# Patient Record
Sex: FEMALE | Race: WHITE | NOT HISPANIC OR LATINO | Employment: FULL TIME
[De-identification: names, ages, dates, MRNs, and addresses within clinical notes are randomized per-mention and may not be internally consistent; named-entity substitution may affect disease eponyms.]

---

## 2018-04-25 ENCOUNTER — TRANSCRIBE ORDERS (OUTPATIENT)
Dept: SCHEDULING | Age: 28
End: 2018-04-25

## 2018-04-25 DIAGNOSIS — Q24.8 CONGENITAL MALPOSITION OF HEART AND CARDIAC APEX: Primary | ICD-10-CM

## 2020-02-27 ENCOUNTER — HOSPITAL ENCOUNTER (EMERGENCY)
Facility: HOSPITAL | Age: 30
Discharge: HOME | End: 2020-02-27
Attending: EMERGENCY MEDICINE
Payer: COMMERCIAL

## 2020-02-27 ENCOUNTER — APPOINTMENT (EMERGENCY)
Dept: RADIOLOGY | Facility: HOSPITAL | Age: 30
End: 2020-02-27
Attending: EMERGENCY MEDICINE
Payer: COMMERCIAL

## 2020-02-27 VITALS
DIASTOLIC BLOOD PRESSURE: 55 MMHG | BODY MASS INDEX: 20.4 KG/M2 | RESPIRATION RATE: 16 BRPM | SYSTOLIC BLOOD PRESSURE: 103 MMHG | TEMPERATURE: 98.1 F | HEART RATE: 75 BPM | WEIGHT: 130 LBS | HEIGHT: 67 IN | OXYGEN SATURATION: 100 %

## 2020-02-27 DIAGNOSIS — M79.662 PAIN AND SWELLING OF LEFT LOWER LEG: Primary | ICD-10-CM

## 2020-02-27 DIAGNOSIS — M79.89 PAIN AND SWELLING OF LEFT LOWER LEG: Primary | ICD-10-CM

## 2020-02-27 PROCEDURE — 99284 EMERGENCY DEPT VISIT MOD MDM: CPT | Mod: 25

## 2020-02-27 PROCEDURE — 93971 EXTREMITY STUDY: CPT | Mod: LT

## 2020-02-27 ASSESSMENT — ENCOUNTER SYMPTOMS
WEAKNESS: 0
FEVER: 0
SHORTNESS OF BREATH: 0
CHILLS: 0
ABDOMINAL PAIN: 0
NUMBNESS: 0
WOUND: 1

## 2020-02-27 NOTE — ED PROVIDER NOTES
"HPI     Chief Complaint   Patient presents with   • Lower Extremity Issue       29-year-old female presents to the ED for evaluation of left leg pain.  Patient reports hitting her left shin a month ago on a fryer, developing small wound, which she was never evaluated for.  She presents with a small healed abrasion over her lower left shin.  Since yesterday she noticed increasing discomfort and \"puffiness\" of her left lower leg around the wound, reports pain severity is 5 out of 10 and worsens with bearing weight.  She is concerned about blood clot due to having a positive home pregnancy test last week, LNMP:  (A) .  Denies chest pain, shortness of breath, history of previous blood clots, recent long car rides or plane trips, recent surgery, OCP use, drainage from her wound, fever.       History provided by:  Patient  Lower Extremity Issue   Associated symptoms: no fever         Patient History     History reviewed. No pertinent past medical history.    Past Surgical History:   Procedure Laterality Date   • CYST REMOVAL      on heart       History reviewed. No pertinent family history.    Social History     Tobacco Use   • Smoking status: Never Smoker   • Smokeless tobacco: Never Used   Substance Use Topics   • Alcohol use: Not Currently   • Drug use: Not Currently       Systems Reviewed from Nursing Triage:          Review of Systems     Review of Systems   Constitutional: Negative for chills and fever.   Respiratory: Negative for shortness of breath.    Cardiovascular: Positive for leg swelling (left leg). Negative for chest pain.   Gastrointestinal: Negative for abdominal pain.   Genitourinary: Negative for vaginal bleeding.   Musculoskeletal: Negative for gait problem.        Left lower leg pain   Skin: Positive for wound.   Neurological: Negative for weakness and numbness.        Physical Exam     ED Triage Vitals [20 1817]   Temp Heart Rate Resp BP SpO2   36.8 °C (98.3 °F) 74 18 (!) 101/55 " "100 %      Temp Source Heart Rate Source Patient Position BP Location FiO2 (%) (Set)   Tympanic Monitor Sitting Left upper arm --                     Patient Vitals for the past 24 hrs:   BP Temp Temp src Pulse Resp SpO2 Height Weight   02/27/20 1958 (!) 103/55 36.7 °C (98.1 °F) Tympanic 75 16 100 % -- --   02/27/20 1817 (!) 101/55 36.8 °C (98.3 °F) Tympanic 74 18 100 % 1.702 m (5' 7\") 59 kg (130 lb)                                       Physical Exam   Constitutional: She appears well-developed and well-nourished. No distress.   Cardiovascular: Normal rate, regular rhythm and normal heart sounds.   Pulses:       Dorsalis pedis pulses are 2+ on the left side.        Posterior tibial pulses are 2+ on the left side.   Pulmonary/Chest: Effort normal and breath sounds normal. No respiratory distress.   Musculoskeletal:   Left calf soft, NTTP, negative camelia's sign    Mild left shin TTP with minimal swelling   Neurological: She is alert.   LLE NVI   Skin: Skin is warm and dry.   Small 1cm well healing abrasion left distal shin, no erythema, drainage, or warmth    Nursing note and vitals reviewed.           Procedures    ED Course & MDM     Labs Reviewed - No data to display    US venous leg, LL extremity   Final Result   IMPRESSION:   No evidence for left - sided deep venous thrombosis.      I certify that I have personally reviewed this examination and agree with this   report.   Jak Farfan MD                  MDM  Number of Diagnoses or Management Options  Pain and swelling of left lower leg:      Amount and/or Complexity of Data Reviewed  Tests in the radiology section of CPT®: ordered and reviewed             ED Course as of Feb 27 2119   Thu Feb 27, 2020   1840 Plan: LLE US to r/o DVT    [TC]   1941 IMPRESSION:  No evidence for left - sided deep venous thrombosis.   US venous leg, LL extremity [TC]   1941 Will have pt FU with PCP for repeat US in 10 days if sx persist. Elevation, compression stockings, and " APAP recommended for pain and swelling.    [TC]      ED Course User Index  [TC] Matilde Castro PA C         Clinical Impressions as of Feb 27 2119   Pain and swelling of left lower leg     Disposition  Discharged      Matilde Castro PA C  02/27/20 2120

## 2020-02-27 NOTE — ED ATTESTATION NOTE
"I have personally seen and examined the patient.  I reviewed and agree with physician assistant / nurse practitioner’s assessment and plan of care, with the following exceptions: None  My examination, assessment, and plan of care of Shreya De Jesus is as follows:     Patient is a 29y F presenting to the ED due to concern for LLE swelling. Bumped her L shin about a month ago. Abrasion has been healing. No stitched/medical care sought at the time. Over past few days, has noticed \"puffiness\" around the wound. No drainage. No fevers    On exam, small subcentimeter healing wound to the anterior L shin. Minimal surrounding soft tissue swelling. No tenderness/drainage/erythema.     Patient recently had + pregnancy test. No complaints related to this. Will check LLE US to r/o DVT. Clinically, no signs of infection     Kelly Ryder MD  02/27/20 9016    "

## 2020-02-28 NOTE — DISCHARGE INSTRUCTIONS
Take only Tylenol as needed for pain as instructed on the bottle    Return to the emergency department for worsening of symptoms or if you develop any new concerning symptoms including   chest pain, difficulty breathing, leg redness, drainage from wound.

## 2020-03-19 ENCOUNTER — APPOINTMENT (EMERGENCY)
Dept: RADIOLOGY | Facility: HOSPITAL | Age: 30
End: 2020-03-19
Attending: EMERGENCY MEDICINE
Payer: COMMERCIAL

## 2020-03-19 ENCOUNTER — HOSPITAL ENCOUNTER (EMERGENCY)
Facility: HOSPITAL | Age: 30
Discharge: HOME | End: 2020-03-19
Attending: EMERGENCY MEDICINE
Payer: COMMERCIAL

## 2020-03-19 VITALS
OXYGEN SATURATION: 100 % | TEMPERATURE: 98.9 F | HEART RATE: 47 BPM | DIASTOLIC BLOOD PRESSURE: 53 MMHG | SYSTOLIC BLOOD PRESSURE: 94 MMHG | RESPIRATION RATE: 18 BRPM

## 2020-03-19 DIAGNOSIS — B34.9 VIRAL SYNDROME: Primary | ICD-10-CM

## 2020-03-19 LAB
ANION GAP SERPL CALC-SCNC: 10 MEQ/L (ref 3–15)
BASOPHILS # BLD: 0.03 K/UL (ref 0.01–0.1)
BASOPHILS NFR BLD: 0.4 %
BUN SERPL-MCNC: 7 MG/DL (ref 8–20)
CALCIUM SERPL-MCNC: 9.5 MG/DL (ref 8.9–10.3)
CHLORIDE SERPL-SCNC: 103 MEQ/L (ref 98–109)
CO2 SERPL-SCNC: 23 MEQ/L (ref 22–32)
CREAT SERPL-MCNC: 0.7 MG/DL (ref 0.6–1.1)
D DIMER PPP IA.FEU-MCNC: 0.45 UG/ML FEU (ref 0–0.5)
DIFFERENTIAL METHOD BLD: NORMAL
EOSINOPHIL # BLD: 0.07 K/UL (ref 0.04–0.36)
EOSINOPHIL NFR BLD: 0.8 %
ERYTHROCYTE [DISTWIDTH] IN BLOOD BY AUTOMATED COUNT: 12.8 % (ref 11.7–14.4)
FLUAV RNA SPEC QL NAA+PROBE: NEGATIVE
FLUBV RNA SPEC QL NAA+PROBE: NEGATIVE
GFR SERPL CREATININE-BSD FRML MDRD: >60 ML/MIN/1.73M*2
GLUCOSE SERPL-MCNC: 83 MG/DL (ref 70–99)
HCT VFR BLDCO AUTO: 36.7 % (ref 35–45)
HGB BLD-MCNC: 12.3 G/DL (ref 11.8–15.7)
IMM GRANULOCYTES # BLD AUTO: 0.03 K/UL (ref 0–0.08)
IMM GRANULOCYTES NFR BLD AUTO: 0.4 %
LYMPHOCYTES # BLD: 1.83 K/UL (ref 1.2–3.5)
LYMPHOCYTES NFR BLD: 22 %
MCH RBC QN AUTO: 28.9 PG (ref 28–33.2)
MCHC RBC AUTO-ENTMCNC: 33.5 G/DL (ref 32.2–35.5)
MCV RBC AUTO: 86.2 FL (ref 83–98)
MONOCYTES # BLD: 0.76 K/UL (ref 0.28–0.8)
MONOCYTES NFR BLD: 9.2 %
NEUTROPHILS # BLD: 5.58 K/UL (ref 1.7–7)
NEUTS SEG NFR BLD: 67.2 %
NRBC BLD-RTO: 0 %
PDW BLD AUTO: 11.2 FL (ref 9.4–12.3)
PLATELET # BLD AUTO: 230 K/UL (ref 150–369)
POTASSIUM SERPL-SCNC: 3.9 MEQ/L (ref 3.6–5.1)
RBC # BLD AUTO: 4.26 M/UL (ref 3.93–5.22)
RSV RNA SPEC QL NAA+PROBE: NEGATIVE
SODIUM SERPL-SCNC: 136 MEQ/L (ref 136–144)
TROPONIN I SERPL-MCNC: <0.02 NG/ML
WBC # BLD AUTO: 8.3 K/UL (ref 3.8–10.5)

## 2020-03-19 PROCEDURE — 87631 RESP VIRUS 3-5 TARGETS: CPT | Performed by: PHYSICIAN ASSISTANT

## 2020-03-19 PROCEDURE — 85379 FIBRIN DEGRADATION QUANT: CPT | Performed by: PHYSICIAN ASSISTANT

## 2020-03-19 PROCEDURE — U0002 COVID-19 LAB TEST NON-CDC: HCPCS | Performed by: PHYSICIAN ASSISTANT

## 2020-03-19 PROCEDURE — 25800000 HC PHARMACY IV SOLUTIONS: Performed by: EMERGENCY MEDICINE

## 2020-03-19 PROCEDURE — 96361 HYDRATE IV INFUSION ADD-ON: CPT

## 2020-03-19 PROCEDURE — 99284 EMERGENCY DEPT VISIT MOD MDM: CPT | Mod: 25

## 2020-03-19 PROCEDURE — 84484 ASSAY OF TROPONIN QUANT: CPT | Performed by: PHYSICIAN ASSISTANT

## 2020-03-19 PROCEDURE — 80048 BASIC METABOLIC PNL TOTAL CA: CPT | Performed by: PHYSICIAN ASSISTANT

## 2020-03-19 PROCEDURE — 71045 X-RAY EXAM CHEST 1 VIEW: CPT

## 2020-03-19 PROCEDURE — 93005 ELECTROCARDIOGRAM TRACING: CPT | Performed by: EMERGENCY MEDICINE

## 2020-03-19 PROCEDURE — 96360 HYDRATION IV INFUSION INIT: CPT

## 2020-03-19 PROCEDURE — 85025 COMPLETE CBC W/AUTO DIFF WBC: CPT | Performed by: PHYSICIAN ASSISTANT

## 2020-03-19 PROCEDURE — 3E0337Z INTRODUCTION OF ELECTROLYTIC AND WATER BALANCE SUBSTANCE INTO PERIPHERAL VEIN, PERCUTANEOUS APPROACH: ICD-10-PCS | Performed by: EMERGENCY MEDICINE

## 2020-03-19 PROCEDURE — 36415 COLL VENOUS BLD VENIPUNCTURE: CPT | Performed by: PHYSICIAN ASSISTANT

## 2020-03-19 RX ADMIN — SODIUM CHLORIDE 1000 ML: 9 INJECTION, SOLUTION INTRAVENOUS at 17:38

## 2020-03-19 ASSESSMENT — ENCOUNTER SYMPTOMS
FEVER: 0
CHEST TIGHTNESS: 0
CHILLS: 0
PALPITATIONS: 0
RHINORRHEA: 1
COUGH: 0
DIZZINESS: 0
SHORTNESS OF BREATH: 1
VOMITING: 0
DIARRHEA: 0
HEADACHES: 0
ABDOMINAL PAIN: 0
APPETITE CHANGE: 0
LIGHT-HEADEDNESS: 0
NAUSEA: 0

## 2020-03-19 NOTE — ED PROVIDER NOTES
HPI     Chief Complaint   Patient presents with   • Nasal Congestion     started today with intermittent sob up the steps       Patient is a 29-year-old female G3, P2 currently 6 weeks pregnant without significant medical history presenting with chief complaint of nasal congestion and shortness of breath.  Patient states about 3 days ago she began to experience a sore throat which is now gone and now experiencing nasal congestion/rhinorrhea.  Patient admits to dyspnea which started today, most noticeable when she walks up stairs.  She denies any cough or chest pain.  Patient denies fever/chills.  She denies any sick contacts with similar symptoms.  She denies personal or family history of DVT/PE, recent lengthy travel, calf pain/swelling.  Patient notes she works at [employer] and therefore is around many people each day.      History provided by:  Patient       Patient History     History reviewed. No pertinent past medical history.    Past Surgical History:   Procedure Laterality Date   • CYST REMOVAL      on heart       History reviewed. No pertinent family history.    Social History     Tobacco Use   • Smoking status: Never Smoker   • Smokeless tobacco: Never Used   Substance Use Topics   • Alcohol use: Not Currently   • Drug use: Not Currently       Systems Reviewed from Nursing Triage:          Review of Systems     Review of Systems   Constitutional: Negative for appetite change, chills and fever.   HENT: Positive for congestion and rhinorrhea.    Respiratory: Positive for shortness of breath. Negative for cough and chest tightness.    Cardiovascular: Negative for chest pain, palpitations and leg swelling.   Gastrointestinal: Negative for abdominal pain, diarrhea, nausea and vomiting.   Neurological: Negative for dizziness, light-headedness and headaches.        Physical Exam     ED Triage Vitals   Temp Heart Rate Resp BP SpO2   03/19/20 1657 03/19/20 1657 03/19/20 1657 03/19/20 1700 03/19/20 1657   37.2 °C (99 °F)  (!) 58 20 107/63 100 %      Temp Source Heart Rate Source Patient Position BP Location FiO2 (%) (Set)   03/19/20 1657 -- 03/19/20 1657 03/19/20 1657 --   Tympanic  Lying Right upper arm        Pulse Ox %: 100 % (03/19/20 1728)  Pulse Ox Interpretation: Normal (03/19/20 1728)  Heart Rate: 58 (03/19/20 1728)  Rhythm Strip Interpretation: Sinus Bradycardia (03/19/20 1728)    Patient Vitals for the past 24 hrs:   BP Temp Temp src Pulse Resp SpO2 Weight   03/19/20 1908 (!) 94/53 37.2 °C (98.9 °F) Tympanic (!) 47 18 100 % --   03/19/20 1700 107/63 -- -- -- -- -- --   03/19/20 1657 -- 37.2 °C (99 °F) Tympanic (!) 58 20 100 % --                                       Physical Exam   Constitutional: She is oriented to person, place, and time. She appears well-developed and well-nourished. No distress.   HENT:   Head: Normocephalic and atraumatic.   Eyes: Pupils are equal, round, and reactive to light. Conjunctivae and EOM are normal.   Neck: Normal range of motion.   Cardiovascular: Normal rate and regular rhythm.   Pulses:       Radial pulses are 2+ on the right side, and 2+ on the left side.        Dorsalis pedis pulses are 2+ on the right side, and 2+ on the left side.        Posterior tibial pulses are 2+ on the right side, and 2+ on the left side.   Pulmonary/Chest: Effort normal and breath sounds normal. No stridor. No respiratory distress. She has no wheezes. She has no rales.   Abdominal: Soft. She exhibits no distension. There is no tenderness.   Musculoskeletal:        Right lower leg: She exhibits no tenderness and no edema.        Left lower leg: She exhibits no tenderness and no edema.   Neurological: She is alert and oriented to person, place, and time.   Skin: Skin is warm and dry.   Nursing note and vitals reviewed.           Procedures    ED Course & MDM     Labs Reviewed   BASIC METABOLIC PANEL - Abnormal       Result Value    Sodium 136      Potassium 3.9      Chloride 103      CO2 23      BUN 7 (*)      Creatinine 0.7      Glucose 83      Calcium 9.5      eGFR >60.0      Anion Gap 10     RSV AND INFLUENZA A/B NUCLEIC ACIDS, PCR - Normal    Influenza A Negative      Influenza B Negative      Respiratory Syncytial Virus Negative     D-DIMER - Normal    D-Dimer, Quant 0.45     TROPONIN I - Normal    Troponin I <0.02     CBC AND DIFF    WBC 8.30      RBC 4.26      Hemoglobin 12.3      Hematocrit 36.7      MCV 86.2      MCH 28.9      MCHC 33.5      RDW 12.8      Platelets 230      MPV 11.2      Differential Type Auto      nRBC 0.0      Immature Granulocytes 0.4      Neutrophils 67.2      Lymphocytes 22.0      Monocytes 9.2      Eosinophils 0.8      Basophils 0.4      Immature Granulocytes, Absolute 0.03      Neutrophils, Absolute 5.58      Lymphocytes, Absolute 1.83      Monocytes, Absolute 0.76      Eosinophils, Absolute 0.07      Basophils, Absolute 0.03     COVID-19 ([facility])       ECG 12 lead   ED Interpretation   sb 53   Na   qtc 369   nostemi            X-RAY CHEST 1 VIEW   Final Result   IMPRESSION: No acute disease appreciated                  [facility]         ED Course as of Mar 19 1938   Thu Mar 19, 2020   1810 D-Dimer, Quant: 0.45 [KM]   1834 IMPRESSION: No acute disease appreciated   X-RAY CHEST 1 VIEW [KM]   1845 Work-up is unremarkable.  Patient was informed to quarantine until her COVID test is resulted.  Patient advised that anyone in the household also needs to quarantine.  Patient advised to return to the ER immediately if she experiences worsening of symptoms or any other new concerns.  Patient given a work note.    Patient to follow-up with her PCP and OB.    [KM]   1846 Encounter for testing of COVID-19.  Full PPE worn for evaluation.  Pt information sent to [facility] for COVID follow up  Will d/c home to self-quarantine until return call regarding results.  Discussed strict return precautions including worsening condition, difficulty breathing/shortness of breath, chest pain, or any other  concerning symptoms.  Pt verbalizes understanding of these instructions.  Will d/c at this time.       [KM]      ED Course User Index  [KM] Adri Garcia PA C         Clinical Impressions as of Mar 19 1938   Viral syndrome      Dispo  discharge       Adri Garcia PA C  03/19/20 1938

## 2020-03-19 NOTE — ED ATTESTATION NOTE
Physician Attestation:     I personally saw and evaluated the patient, participated in the management, and agree with the findings in the above note except as where stated.  The Physician Assistant and I discussed  the case, workup, and disposition.      Chief Complaint  Chief Complaint   Patient presents with   • Nasal Congestion     started today with intermittent sob up the steps     29-year-old female G3, P2 approximately 6 weeks pregnant without a confirmed intrauterine pregnancy presents to the emergency department for evaluation of URI symptoms and shortness of breath.  Patient states that she started noticing today that she was getting dyspnea on exertion primarily while walking up steps.  No chest pain no hemoptysis no lower leg swelling or pain no recent surgery no recent travel no estrogen use.  Patient without fever without cough does have rhinorrhea sore throat and nasal congestion.  Patient is a  at [employer] concerned that she might have contracted the novel coronavirus.  No known contacts with a diagnosed positive.  She has not had a fever.    Patient is nontoxic well-appearing and in no acute distress  Her vital signs are stable  Regular rate and rhythm  Lungs are clear to auscultation throughout there is no tachypnea no respiratory distress no accessory muscle use  Abdomen is soft nontender non-peritoneal/4  No edema or calf tenderness    Plan: Dyspnea on exertion in the setting of pregnancy.  My suspicion is low for pulmonary embolism and she technically perks out however given she is 6 weeks pregnant we will obtain a d-dimer.  Additionally with the rhinorrhea and other URI symptoms in the setting of this pandemic we will need to test for flu and the coronavirus.  Patient understanding and agreeing with plan       Fredo Olivares,   03/19/20 9961

## 2020-03-19 NOTE — DISCHARGE INSTRUCTIONS
Drink plenty of fluids stay well-hydrated.    Call your PCP and OB within 48 hours to discuss symptoms.    You will need to stay quarantined until your tests are resulted regarding COVID.    Return to the emergency department if you experience worsening of symptoms, chest pain, any other new concerning symptoms.

## 2020-03-20 LAB
ATRIAL RATE: 53
P AXIS: 55
PR INTERVAL: 162
QRS DURATION: 88
QT INTERVAL: 394
QTC CALCULATION(BAZETT): 369
R AXIS: 91
T WAVE AXIS: 71
VENTRICULAR RATE: 53

## 2020-03-21 LAB
QUEST OVERALL RESULT:: NOT DETECTED
SARS-COV RNA SPEC QL NAA+PROBE: NEGATIVE
SARS-COV-2 RNA RESP QL NAA+PROBE: NEGATIVE
SPECIMEN SOURCE: NORMAL
SYMPTOM: NORMAL

## 2020-03-22 NOTE — RESULT ENCOUNTER NOTE
Called patient informed of negative COVID 19 results.
Called patient unable to reach.
Unable to reach patient. Left message for patient to call back`
Improved

## 2020-04-16 ENCOUNTER — TRANSCRIBE ORDERS (OUTPATIENT)
Dept: SCHEDULING | Age: 30
End: 2020-04-16

## 2020-04-21 ENCOUNTER — TRANSCRIBE ORDERS (OUTPATIENT)
Dept: SCHEDULING | Age: 30
End: 2020-04-21

## 2020-04-21 DIAGNOSIS — O46.90 VAGINAL BLEEDING IN PREGNANCY: Primary | ICD-10-CM

## 2020-04-21 DIAGNOSIS — O35.2XX0 MATERNAL CARE FOR (SUSPECTED) HEREDITARY DISEASE IN FETUS, NOT APPLICABLE OR UNSPECIFIED: Primary | ICD-10-CM

## 2020-04-21 LAB
HBV SURFACE AG SER QL: NONREACTIVE
HIV 1+2 AB+HIV1 P24 AG SERPL QL IA: NONREACTIVE
RUBELLA IGG SCREEN: NORMAL

## 2020-04-22 ENCOUNTER — HOSPITAL ENCOUNTER (OUTPATIENT)
Dept: PERINATAL CARE | Facility: HOSPITAL | Age: 30
Discharge: HOME | End: 2020-04-22
Attending: OBSTETRICS & GYNECOLOGY
Payer: COMMERCIAL

## 2020-04-22 VITALS — BODY MASS INDEX: 20.4 KG/M2 | HEIGHT: 67 IN | WEIGHT: 130 LBS

## 2020-04-22 DIAGNOSIS — O35.2XX0 MATERNAL CARE FOR (SUSPECTED) HEREDITARY DISEASE IN FETUS, NOT APPLICABLE OR UNSPECIFIED: ICD-10-CM

## 2020-04-22 DIAGNOSIS — O28.9 ABNORMAL FINDINGS ON ANTENATAL SCREENING: ICD-10-CM

## 2020-04-22 DIAGNOSIS — Z3A.13 13 WEEKS GESTATION OF PREGNANCY: ICD-10-CM

## 2020-04-22 DIAGNOSIS — O09.91 ENCOUNTER FOR SUPERVISION OF HIGH RISK PREGNANCY IN FIRST TRIMESTER, ANTEPARTUM: ICD-10-CM

## 2020-04-22 DIAGNOSIS — O26.841 UTERINE SIZE DATE DISCREPANCY PREGNANCY, FIRST TRIMESTER: ICD-10-CM

## 2020-04-22 DIAGNOSIS — O35.10X0 SUSPECTED CHROMOSOME ANOMALY OF FETUS AFFECTING MANAGEMENT OF MOTHER, ANTEPARTUM, SINGLE OR UNSPECIFIED FETUS: ICD-10-CM

## 2020-04-22 PROCEDURE — 76945 ECHO GUIDE VILLUS SAMPLING: CPT

## 2020-04-22 PROCEDURE — 76801 OB US < 14 WKS SINGLE FETUS: CPT

## 2020-04-22 PROCEDURE — 0UB93ZX EXCISION OF UTERUS, PERCUTANEOUS APPROACH, DIAGNOSTIC: ICD-10-PCS | Performed by: OBSTETRICS & GYNECOLOGY

## 2020-04-30 ENCOUNTER — TRANSCRIBE ORDERS (OUTPATIENT)
Dept: REGISTRATION | Facility: HOSPITAL | Age: 30
End: 2020-04-30

## 2020-04-30 DIAGNOSIS — O35.2XX0 MATERNAL CARE FOR (SUSPECTED) HEREDITARY DISEASE IN FETUS, NOT APPLICABLE OR UNSPECIFIED: Primary | ICD-10-CM

## 2020-05-12 ENCOUNTER — TRANSCRIBE ORDERS (OUTPATIENT)
Dept: REGISTRATION | Facility: HOSPITAL | Age: 30
End: 2020-05-12

## 2020-05-12 ENCOUNTER — HOSPITAL ENCOUNTER (OUTPATIENT)
Dept: PERINATAL CARE | Facility: HOSPITAL | Age: 30
Discharge: HOME | End: 2020-05-12
Attending: OBSTETRICS & GYNECOLOGY
Payer: COMMERCIAL

## 2020-05-12 DIAGNOSIS — D18.1 LYMPHANGIOMA, ANY SITE: ICD-10-CM

## 2020-05-12 DIAGNOSIS — O28.9 ABNORMAL FINDINGS ON ANTENATAL SCREENING: ICD-10-CM

## 2020-05-12 DIAGNOSIS — Z3A.16 16 WEEKS GESTATION OF PREGNANCY: ICD-10-CM

## 2020-05-12 DIAGNOSIS — O35.9XX0 MATERNAL CARE FOR (SUSPECTED) FETAL ABNORMALITY AND DAMAGE, UNSPECIFIED, NOT APPLICABLE OR UNSPECIFIED: Primary | ICD-10-CM

## 2020-05-12 DIAGNOSIS — O09.92 SUPERVISION OF HIGH RISK PREGNANCY, UNSPECIFIED, SECOND TRIMESTER: ICD-10-CM

## 2020-05-12 DIAGNOSIS — O35.10X0 SUSPECTED CHROMOSOME ANOMALY OF FETUS AFFECTING MANAGEMENT OF MOTHER, ANTEPARTUM, SINGLE OR UNSPECIFIED FETUS: ICD-10-CM

## 2020-05-12 DIAGNOSIS — O35.2XX0 MATERNAL CARE FOR (SUSPECTED) HEREDITARY DISEASE IN FETUS, NOT APPLICABLE OR UNSPECIFIED: ICD-10-CM

## 2020-05-12 DIAGNOSIS — O09.92 ENCOUNTER FOR SUPERVISION OF HIGH RISK PREGNANCY IN SECOND TRIMESTER, ANTEPARTUM: ICD-10-CM

## 2020-05-12 PROCEDURE — 76805 OB US >/= 14 WKS SNGL FETUS: CPT

## 2020-06-08 ENCOUNTER — HOSPITAL ENCOUNTER (OUTPATIENT)
Dept: PERINATAL CARE | Facility: HOSPITAL | Age: 30
Discharge: HOME | End: 2020-06-08
Attending: OBSTETRICS & GYNECOLOGY
Payer: COMMERCIAL

## 2020-06-08 DIAGNOSIS — O09.92 SUPERVISION OF HIGH RISK PREGNANCY, UNSPECIFIED, SECOND TRIMESTER: ICD-10-CM

## 2020-06-08 DIAGNOSIS — O35.9XX0 SUSPECTED FETAL ABNORMALITY AFFECTING MANAGEMENT OF MOTHER, ANTEPARTUM, SINGLE OR UNSPECIFIED FETUS: ICD-10-CM

## 2020-06-08 DIAGNOSIS — O28.9 ABNORMAL FINDINGS ON ANTENATAL SCREENING: ICD-10-CM

## 2020-06-08 DIAGNOSIS — D18.1 LYMPHANGIOMA, ANY SITE: ICD-10-CM

## 2020-06-08 DIAGNOSIS — Z3A.20 20 WEEKS GESTATION OF PREGNANCY: ICD-10-CM

## 2020-06-08 DIAGNOSIS — Z36.3 ANTENATAL SCREENING FOR MALFORMATION USING ULTRASONICS: ICD-10-CM

## 2020-06-08 DIAGNOSIS — O35.9XX0 MATERNAL CARE FOR (SUSPECTED) FETAL ABNORMALITY AND DAMAGE, UNSPECIFIED, NOT APPLICABLE OR UNSPECIFIED: ICD-10-CM

## 2020-06-08 DIAGNOSIS — O35.10X0 SUSPECTED CHROMOSOME ANOMALY OF FETUS AFFECTING MANAGEMENT OF MOTHER, ANTEPARTUM, SINGLE OR UNSPECIFIED FETUS: ICD-10-CM

## 2020-06-08 PROCEDURE — 76811 OB US DETAILED SNGL FETUS: CPT

## 2020-07-06 ENCOUNTER — TRANSCRIBE ORDERS (OUTPATIENT)
Dept: SCHEDULING | Age: 30
End: 2020-07-06

## 2020-07-06 DIAGNOSIS — O35.10X0 MATERNAL CARE FOR (SUSPECTED) CHROMOSOMAL ABNORMALITY IN FETUS, NOT APPLICABLE OR UNSPECIFIED: ICD-10-CM

## 2020-07-06 DIAGNOSIS — O09.92 SUPERVISION OF HIGH RISK PREGNANCY, UNSPECIFIED, SECOND TRIMESTER: ICD-10-CM

## 2020-07-06 DIAGNOSIS — Z3A.26 26 WEEKS GESTATION OF PREGNANCY: Primary | ICD-10-CM

## 2020-07-06 DIAGNOSIS — O28.9 UNSPECIFIED ABNORMAL FINDINGS ON ANTENATAL SCREENING OF MOTHER: ICD-10-CM

## 2020-07-15 ENCOUNTER — TELEPHONE (OUTPATIENT)
Dept: GENETICS | Age: 30
End: 2020-07-15

## 2020-07-21 ENCOUNTER — HOSPITAL ENCOUNTER (OUTPATIENT)
Dept: PERINATAL CARE | Facility: HOSPITAL | Age: 30
Discharge: HOME | End: 2020-07-21
Attending: OBSTETRICS & GYNECOLOGY
Payer: COMMERCIAL

## 2020-07-21 ENCOUNTER — TRANSCRIBE ORDERS (OUTPATIENT)
Dept: REGISTRATION | Facility: HOSPITAL | Age: 30
End: 2020-07-21

## 2020-07-21 DIAGNOSIS — O09.92 ENCOUNTER FOR SUPERVISION OF HIGH RISK PREGNANCY IN SECOND TRIMESTER, ANTEPARTUM: ICD-10-CM

## 2020-07-21 DIAGNOSIS — O35.10X0 MATERNAL CARE FOR (SUSPECTED) CHROMOSOMAL ABNORMALITY IN FETUS, NOT APPLICABLE OR UNSPECIFIED: ICD-10-CM

## 2020-07-21 DIAGNOSIS — O28.9 UNSPECIFIED ABNORMAL FINDINGS ON ANTENATAL SCREENING OF MOTHER: ICD-10-CM

## 2020-07-21 DIAGNOSIS — O35.10X0 SUSPECTED CHROMOSOME ANOMALY OF FETUS AFFECTING MANAGEMENT OF MOTHER, ANTEPARTUM, SINGLE OR UNSPECIFIED FETUS: ICD-10-CM

## 2020-07-21 DIAGNOSIS — O28.9 ABNORMAL FINDINGS ON ANTENATAL SCREENING: ICD-10-CM

## 2020-07-21 DIAGNOSIS — Z3A.26 26 WEEKS GESTATION OF PREGNANCY: ICD-10-CM

## 2020-07-21 DIAGNOSIS — O09.92 SUPERVISION OF HIGH RISK PREGNANCY, UNSPECIFIED, SECOND TRIMESTER: ICD-10-CM

## 2020-07-21 DIAGNOSIS — O28.9 UNSPECIFIED ABNORMAL FINDINGS ON ANTENATAL SCREENING OF MOTHER: Primary | ICD-10-CM

## 2020-07-21 PROCEDURE — 76816 OB US FOLLOW-UP PER FETUS: CPT

## 2020-07-23 ENCOUNTER — HOSPITAL ENCOUNTER (EMERGENCY)
Facility: HOSPITAL | Age: 30
Discharge: HOME | End: 2020-07-23
Attending: STUDENT IN AN ORGANIZED HEALTH CARE EDUCATION/TRAINING PROGRAM
Payer: COMMERCIAL

## 2020-07-23 VITALS
DIASTOLIC BLOOD PRESSURE: 61 MMHG | BODY MASS INDEX: 22.76 KG/M2 | WEIGHT: 145 LBS | HEIGHT: 67 IN | OXYGEN SATURATION: 99 % | TEMPERATURE: 98.3 F | SYSTOLIC BLOOD PRESSURE: 106 MMHG | RESPIRATION RATE: 16 BRPM | HEART RATE: 68 BPM

## 2020-07-23 DIAGNOSIS — L02.412 ABSCESS OF AXILLA, LEFT: Primary | ICD-10-CM

## 2020-07-23 PROCEDURE — 99283 EMERGENCY DEPT VISIT LOW MDM: CPT | Mod: 25

## 2020-07-23 PROCEDURE — 10060 I&D ABSCESS SIMPLE/SINGLE: CPT | Mod: LT | Performed by: PHYSICIAN ASSISTANT

## 2020-07-23 PROCEDURE — 63700000 HC SELF-ADMINISTRABLE DRUG: Performed by: PHYSICIAN ASSISTANT

## 2020-07-23 PROCEDURE — 0H9CXZZ DRAINAGE OF LEFT UPPER ARM SKIN, EXTERNAL APPROACH: ICD-10-PCS | Performed by: STUDENT IN AN ORGANIZED HEALTH CARE EDUCATION/TRAINING PROGRAM

## 2020-07-23 RX ORDER — CLINDAMYCIN HYDROCHLORIDE 150 MG/1
300 CAPSULE ORAL ONCE
Status: COMPLETED | OUTPATIENT
Start: 2020-07-23 | End: 2020-07-23

## 2020-07-23 RX ORDER — CLINDAMYCIN HYDROCHLORIDE 300 MG/1
300 CAPSULE ORAL 4 TIMES DAILY
Qty: 28 CAPSULE | Refills: 0 | Status: SHIPPED | OUTPATIENT
Start: 2020-07-23 | End: 2020-07-30

## 2020-07-23 RX ORDER — CLINDAMYCIN PHOSPHATE 10 MG/G
GEL TOPICAL
COMMUNITY
Start: 2020-07-20 | End: 2022-11-08 | Stop reason: HOSPADM

## 2020-07-23 RX ORDER — CLINDAMYCIN HYDROCHLORIDE 300 MG/1
300 CAPSULE ORAL 4 TIMES DAILY
Qty: 28 CAPSULE | Refills: 0 | Status: SHIPPED | OUTPATIENT
Start: 2020-07-23 | End: 2020-07-23 | Stop reason: SDUPTHER

## 2020-07-23 RX ADMIN — CLINDAMYCIN HYDROCHLORIDE 300 MG: 150 CAPSULE ORAL at 18:19

## 2020-07-23 NOTE — ED ATTESTATION NOTE
I personally saw and evaluated the patient, participated in the management, and agree with the findings in the note above except as where stated. The physician assistant and I discussed the case, workup, and disposition.     29-year-old female G3, P2 currently 28 weeks pregnant presenting secondary to left armpit swelling over the last few weeks.  Patient reports that she has noticed an abscess that has been getting more more swollen and painful with erythema to the area.  She reports she presented to a urgent care center 3 days ago and was given a clindamycin gel.  She has been using that along with warm compresses; however, symptoms are worsening prompting ED visit.  She otherwise denies any fevers, chills, headache dizziness, nausea, vomiting, chest pain, difficulty breathing.  Patient reports normal fetal movement she denies any loss of fluid or vaginal bleeding.    On physical exam patient is resting comfortably in no acute distress  She has a 3 x 4 cm left axillary abscess with overlying erythema and tenderness to palpation.  There is fluctuance  There is mild surrounding erythema around the abscess  Abdomen is gravid, nontender    Perform I&D and give patient prescription for outpatient antibiotics     Coleen Millard MD  07/23/20 2515

## 2020-07-23 NOTE — DISCHARGE INSTRUCTIONS
Please call the surgeon tomorrow morning to arrange for patient to follow-up with your check.  Please remove packing in 3 days as discussed.    If you develop fever chills severe pain bilaterally please return to the ER for further evaluation.    Please take a probiotic while taking clindamycin.

## 2020-07-23 NOTE — ED PROVIDER NOTES
"HPI     Chief Complaint   Patient presents with   • Abscess       Patient reports for last few weeks she has had increasing swelling to her left armpit.  She states that she went to urgent care 3 days ago was given Clinda mycin gel.  She states that she is been using warm compresses but has been expanding more painful she came to the ER today for further evaluation.  Patient denies any fever or chills.  She denies any nauseous.  Patient states she is a G3, P2 third trimester at 28 weeks.           Patient History     History reviewed. No pertinent past medical history.    Past Surgical History:   Procedure Laterality Date   • CYST REMOVAL      on heart       History reviewed. No pertinent family history.    Social History     Tobacco Use   • Smoking status: Never Smoker   • Smokeless tobacco: Never Used   Substance Use Topics   • Alcohol use: Not Currently   • Drug use: Not Currently       Systems Reviewed from Nursing Triage:          Review of Systems     Review of Systems   Constitutional: Negative for chills and fever.   Respiratory: Negative for chest tightness and shortness of breath.    Cardiovascular: Negative for chest pain.   Gastrointestinal: Negative for abdominal pain, nausea and vomiting.        Physical Exam     ED Triage Vitals [07/23/20 1558]   Temp Heart Rate Resp BP SpO2   36.8 °C (98.3 °F) 80 16 112/61 97 %      Temp Source Heart Rate Source Patient Position BP Location FiO2 (%) (Set)   Oral -- Sitting Right upper arm --       Pulse Ox %: 97 % (07/23/20 2114)  Pulse Ox Interpretation: Normal (07/23/20 2114)  Heart Rate: 80 (07/23/20 2114)       Patient Vitals for the past 24 hrs:   BP Temp Temp src Pulse Resp SpO2 Height Weight   07/23/20 1821 106/61 -- -- 68 16 99 % -- --   07/23/20 1558 112/61 36.8 °C (98.3 °F) Oral 80 16 97 % 1.702 m (5' 7\") 65.8 kg (145 lb)                                          Physical Exam   Constitutional: She is oriented to person, place, and time.   Cardiovascular: " "Normal rate, regular rhythm and normal heart sounds.   No murmur heard.  Pulmonary/Chest: Effort normal and breath sounds normal. No stridor. No respiratory distress. She has no wheezes.   Musculoskeletal:        Arms:  Neurological: She is alert and oriented to person, place, and time.   Skin: Skin is warm.   Psychiatric: She has a normal mood and affect.            Incision and Drainage  Date/Time: 7/23/2020 5:49 PM  Performed by: Alan Mcintosh PA C  Authorized by: Coleen Millard MD     Consent:     Consent obtained:  Verbal    Consent given by:  Patient    Risks discussed:  Bleeding, infection, incomplete drainage and pain    Alternatives discussed:  No treatment  Location:     Type:  Abscess    Location:  Upper extremity    Upper extremity location:  Shoulder    Shoulder location:  L shoulder  Pre-procedure details:     Skin preparation:  Betadine  Anesthesia (see MAR for exact dosages):     Anesthesia method:  Local infiltration    Local anesthetic:  Lidocaine 1% w/o epi  Procedure type:     Complexity:  Simple  Procedure details:     Incision types:  Single straight    Incision depth:  Subcutaneous    Scalpel blade:  15    Wound management:  Irrigated with saline, extensive cleaning and probed and deloculated    Drainage:  Bloody and purulent    Drainage amount:  Moderate    Wound treatment:  Wound left open    Packing materials:  1/4 in gauze    Amount 1/4\":  5cm  Post-procedure details:     Patient tolerance of procedure:  Tolerated well, no immediate complications        Labs Reviewed - No data to display    No orders to display               ED Course & MDM     MDM         ED Course as of Jul 24 0928   u Jul 23, 2020   1828 I spoke with Dr. Lamb for AMARIS Liao for clindamycin p.o. as outpatient.    [JR]      ED Course User Index  [JR] Alan Mcintosh PA C         Clinical Impressions as of Jul 24 0928   Abscess of axilla, left        Alan Mcintosh PA C  07/24/20 0928    "

## 2020-07-24 ENCOUNTER — TELEPHONE (OUTPATIENT)
Dept: SURGERY | Facility: CLINIC | Age: 30
End: 2020-07-24

## 2020-07-24 ASSESSMENT — ENCOUNTER SYMPTOMS
VOMITING: 0
SHORTNESS OF BREATH: 0
FEVER: 0
ABDOMINAL PAIN: 0
CHILLS: 0
CHEST TIGHTNESS: 0
NAUSEA: 0

## 2020-09-01 ENCOUNTER — HOSPITAL ENCOUNTER (OUTPATIENT)
Dept: PERINATAL CARE | Facility: HOSPITAL | Age: 30
Discharge: HOME | End: 2020-09-01
Attending: OBSTETRICS & GYNECOLOGY
Payer: COMMERCIAL

## 2020-09-01 ENCOUNTER — TRANSCRIBE ORDERS (OUTPATIENT)
Dept: REGISTRATION | Facility: HOSPITAL | Age: 30
End: 2020-09-01

## 2020-09-01 DIAGNOSIS — O35.10X0 SUSPECTED CHROMOSOME ANOMALY OF FETUS AFFECTING MANAGEMENT OF MOTHER, ANTEPARTUM, SINGLE OR UNSPECIFIED FETUS: ICD-10-CM

## 2020-09-01 DIAGNOSIS — O35.10X0 MATERNAL CARE FOR (SUSPECTED) CHROMOSOMAL ABNORMALITY IN FETUS, NOT APPLICABLE OR UNSPECIFIED: ICD-10-CM

## 2020-09-01 DIAGNOSIS — O26.843 UTERINE SIZE DATE DISCREPANCY PREGNANCY, THIRD TRIMESTER: ICD-10-CM

## 2020-09-01 DIAGNOSIS — O09.92 SUPERVISION OF HIGH RISK PREGNANCY, UNSPECIFIED, SECOND TRIMESTER: Primary | ICD-10-CM

## 2020-09-01 DIAGNOSIS — Z3A.32 32 WEEKS GESTATION OF PREGNANCY: ICD-10-CM

## 2020-09-01 DIAGNOSIS — O28.9 UNSPECIFIED ABNORMAL FINDINGS ON ANTENATAL SCREENING OF MOTHER: ICD-10-CM

## 2020-09-01 DIAGNOSIS — O28.9 ABNORMAL FINDINGS ON ANTENATAL SCREENING: ICD-10-CM

## 2020-09-01 DIAGNOSIS — O09.93 ENCOUNTER FOR SUPERVISION OF HIGH RISK PREGNANCY IN THIRD TRIMESTER, ANTEPARTUM: ICD-10-CM

## 2020-09-01 PROCEDURE — 76816 OB US FOLLOW-UP PER FETUS: CPT

## 2020-09-29 ENCOUNTER — HOSPITAL ENCOUNTER (OUTPATIENT)
Dept: PERINATAL CARE | Facility: HOSPITAL | Age: 30
Discharge: HOME | End: 2020-09-29
Attending: OBSTETRICS & GYNECOLOGY
Payer: COMMERCIAL

## 2020-09-29 DIAGNOSIS — O09.93 HIGH-RISK PREGNANCY IN THIRD TRIMESTER: ICD-10-CM

## 2020-09-29 DIAGNOSIS — Z3A.36 36 WEEKS GESTATION OF PREGNANCY: ICD-10-CM

## 2020-09-29 DIAGNOSIS — O35.10X0 CHROMOSOMAL ABNORMALITY IN FETUS, AFFECTING MANAGEMENT OF MOTHER, WITH DELIVERY: ICD-10-CM

## 2020-09-29 DIAGNOSIS — O09.92 SUPERVISION OF HIGH RISK PREGNANCY, UNSPECIFIED, SECOND TRIMESTER: ICD-10-CM

## 2020-09-29 PROCEDURE — 76816 OB US FOLLOW-UP PER FETUS: CPT

## 2020-10-03 LAB — GP B STREP SPEC QL CULT: NORMAL

## 2020-10-19 ENCOUNTER — TRANSCRIBE ORDERS (OUTPATIENT)
Dept: SCHEDULING | Age: 30
End: 2020-10-19

## 2020-10-19 ENCOUNTER — APPOINTMENT (OUTPATIENT)
Dept: LAB | Age: 30
End: 2020-10-19
Attending: OBSTETRICS & GYNECOLOGY
Payer: COMMERCIAL

## 2020-10-19 DIAGNOSIS — Z01.818 ENCOUNTER FOR OTHER PREPROCEDURAL EXAMINATION: ICD-10-CM

## 2020-10-19 DIAGNOSIS — Z01.818 ENCOUNTER FOR OTHER PREPROCEDURAL EXAMINATION: Primary | ICD-10-CM

## 2020-10-19 PROCEDURE — U0003 INFECTIOUS AGENT DETECTION BY NUCLEIC ACID (DNA OR RNA); SEVERE ACUTE RESPIRATORY SYNDROME CORONAVIRUS 2 (SARS-COV-2) (CORONAVIRUS DISEASE [COVID-19]), AMPLIFIED PROBE TECHNIQUE, MAKING USE OF HIGH THROUGHPUT TECHNOLOGIES AS DESCRIBED BY CMS-2020-01-R: HCPCS

## 2020-10-21 ENCOUNTER — APPOINTMENT (OUTPATIENT)
Dept: OBSTETRICS AND GYNECOLOGY | Facility: HOSPITAL | Age: 30
End: 2020-10-21
Payer: COMMERCIAL

## 2020-10-21 ENCOUNTER — HOSPITAL ENCOUNTER (INPATIENT)
Facility: HOSPITAL | Age: 30
LOS: 3 days | Discharge: HOME | End: 2020-10-24
Attending: OBSTETRICS & GYNECOLOGY | Admitting: OBSTETRICS & GYNECOLOGY
Payer: COMMERCIAL

## 2020-10-21 PROBLEM — Z34.90 TERM PREGNANCY: Status: ACTIVE | Noted: 2020-10-21

## 2020-10-21 LAB
ABO + RH BLD: NORMAL
BLD GP AB SCN SERPL QL: NEGATIVE
D AG BLD QL: POSITIVE
ERYTHROCYTE [DISTWIDTH] IN BLOOD BY AUTOMATED COUNT: 14.2 % (ref 11.7–14.4)
HCT VFR BLDCO AUTO: 28.6 % (ref 35–45)
HGB BLD-MCNC: 9.1 G/DL (ref 11.8–15.7)
LABORATORY COMMENT REPORT: NORMAL
MCH RBC QN AUTO: 25.6 PG (ref 28–33.2)
MCHC RBC AUTO-ENTMCNC: 31.8 G/DL (ref 32.2–35.5)
MCV RBC AUTO: 80.3 FL (ref 83–98)
PDW BLD AUTO: 12.3 FL (ref 9.4–12.3)
PLATELET # BLD AUTO: 227 K/UL (ref 150–369)
RBC # BLD AUTO: 3.56 M/UL (ref 3.93–5.22)
SARS-COV-2 RNA RESP QL NAA+PROBE: NOT DETECTED
WBC # BLD AUTO: 13.17 K/UL (ref 3.8–10.5)

## 2020-10-21 PROCEDURE — 63600000 HC DRUGS/DETAIL CODE: Performed by: OBSTETRICS & GYNECOLOGY

## 2020-10-21 PROCEDURE — 86900 BLOOD TYPING SEROLOGIC ABO: CPT

## 2020-10-21 PROCEDURE — 86780 TREPONEMA PALLIDUM: CPT | Performed by: OBSTETRICS & GYNECOLOGY

## 2020-10-21 PROCEDURE — 36415 COLL VENOUS BLD VENIPUNCTURE: CPT | Performed by: OBSTETRICS & GYNECOLOGY

## 2020-10-21 PROCEDURE — 12000000 HC ROOM AND CARE MED/SURG

## 2020-10-21 PROCEDURE — 85027 COMPLETE CBC AUTOMATED: CPT | Performed by: OBSTETRICS & GYNECOLOGY

## 2020-10-21 PROCEDURE — 25000000 HC PHARMACY GENERAL: Performed by: OBSTETRICS & GYNECOLOGY

## 2020-10-21 RX ORDER — SODIUM CHLORIDE, SODIUM LACTATE, POTASSIUM CHLORIDE, CALCIUM CHLORIDE 600; 310; 30; 20 MG/100ML; MG/100ML; MG/100ML; MG/100ML
125 INJECTION, SOLUTION INTRAVENOUS CONTINUOUS
Status: DISCONTINUED | OUTPATIENT
Start: 2020-10-21 | End: 2020-10-22

## 2020-10-21 RX ORDER — OXYTOCIN 10 [USP'U]/ML
10 INJECTION, SOLUTION INTRAMUSCULAR; INTRAVENOUS ONCE AS NEEDED
Status: CANCELLED | OUTPATIENT
Start: 2020-10-21

## 2020-10-21 RX ORDER — METHYLERGONOVINE MALEATE 0.2 MG/ML
200 INJECTION INTRAVENOUS ONCE AS NEEDED
Status: CANCELLED | OUTPATIENT
Start: 2020-10-21

## 2020-10-21 RX ORDER — MISOPROSTOL 200 UG/1
1000 TABLET ORAL ONCE AS NEEDED
Status: CANCELLED | OUTPATIENT
Start: 2020-10-21

## 2020-10-21 RX ORDER — LIDOCAINE HYDROCHLORIDE 10 MG/ML
0-30 INJECTION, SOLUTION EPIDURAL; INFILTRATION; INTRACAUDAL; PERINEURAL ONCE AS NEEDED
Status: CANCELLED | OUTPATIENT
Start: 2020-10-21

## 2020-10-21 RX ORDER — OXYTOCIN/0.9 % SODIUM CHLORIDE 30/500 ML
0-20 PLASTIC BAG, INJECTION (ML) INTRAVENOUS CONTINUOUS
Status: DISCONTINUED | OUTPATIENT
Start: 2020-10-21 | End: 2020-10-22

## 2020-10-21 RX ORDER — CARBOPROST TROMETHAMINE 250 UG/ML
250 INJECTION, SOLUTION INTRAMUSCULAR ONCE AS NEEDED
Status: CANCELLED | OUTPATIENT
Start: 2020-10-21

## 2020-10-21 RX ORDER — TRANEXAMIC ACID 10 MG/ML
1000 INJECTION, SOLUTION INTRAVENOUS ONCE AS NEEDED
Status: CANCELLED | OUTPATIENT
Start: 2020-10-21

## 2020-10-21 RX ADMIN — OXYTOCIN-SODIUM CHLORIDE 0.9% IV SOLN 30 UNIT/500ML 2 MILLI-UNITS/MIN: 30-0.9/5 SOLUTION at 20:56

## 2020-10-21 RX ADMIN — SODIUM CHLORIDE, POTASSIUM CHLORIDE, SODIUM LACTATE AND CALCIUM CHLORIDE 1000 ML: 600; 310; 30; 20 INJECTION, SOLUTION INTRAVENOUS at 20:25

## 2020-10-21 RX ADMIN — SODIUM CHLORIDE, POTASSIUM CHLORIDE, SODIUM LACTATE AND CALCIUM CHLORIDE 125 ML/HR: 600; 310; 30; 20 INJECTION, SOLUTION INTRAVENOUS at 21:02

## 2020-10-21 SDOH — ECONOMIC STABILITY: FOOD INSECURITY: WITHIN THE PAST 12 MONTHS, YOU WORRIED THAT YOUR FOOD WOULD RUN OUT BEFORE YOU GOT THE MONEY TO BUY MORE.: NEVER TRUE

## 2020-10-21 SDOH — ECONOMIC STABILITY: FOOD INSECURITY: WITHIN THE PAST 12 MONTHS, THE FOOD YOU BOUGHT JUST DIDN'T LAST AND YOU DIDN'T HAVE MONEY TO GET MORE.: NEVER TRUE

## 2020-10-21 SDOH — ECONOMIC STABILITY: TRANSPORTATION INSECURITY: IN THE PAST 12 MONTHS, HAS LACK OF TRANSPORTATION KEPT YOU FROM MEDICAL APPOINTMENTS OR FROM GETTING MEDICATIONS?: NO

## 2020-10-21 SDOH — ECONOMIC STABILITY: FOOD INSECURITY: HOW HARD IS IT FOR YOU TO PAY FOR THE VERY BASICS LIKE FOOD, HOUSING, MEDICAL CARE, AND HEATING?: NOT HARD AT ALL

## 2020-10-21 ASSESSMENT — ACTIVITIES OF DAILY LIVING (ADL): LACK_OF_TRANSPORTATION: NO

## 2020-10-22 ENCOUNTER — ANESTHESIA EVENT (INPATIENT)
Dept: OBSTETRICS AND GYNECOLOGY | Facility: HOSPITAL | Age: 30
End: 2020-10-22
Payer: COMMERCIAL

## 2020-10-22 ENCOUNTER — ANESTHESIA (INPATIENT)
Dept: OBSTETRICS AND GYNECOLOGY | Facility: HOSPITAL | Age: 30
End: 2020-10-22
Payer: COMMERCIAL

## 2020-10-22 LAB — T PALLIDUM AB SER QL IF: NONREACTIVE

## 2020-10-22 PROCEDURE — 25800000 HC PHARMACY IV SOLUTIONS: Performed by: STUDENT IN AN ORGANIZED HEALTH CARE EDUCATION/TRAINING PROGRAM

## 2020-10-22 PROCEDURE — 25000000 HC PHARMACY GENERAL: Performed by: STUDENT IN AN ORGANIZED HEALTH CARE EDUCATION/TRAINING PROGRAM

## 2020-10-22 PROCEDURE — 27200130 HC EPIDURAL ANES TRAY

## 2020-10-22 PROCEDURE — 3E033VJ INTRODUCTION OF OTHER HORMONE INTO PERIPHERAL VEIN, PERCUTANEOUS APPROACH: ICD-10-PCS | Performed by: OBSTETRICS & GYNECOLOGY

## 2020-10-22 PROCEDURE — 12000000 HC ROOM AND CARE MED/SURG

## 2020-10-22 PROCEDURE — 37000005 HC ANESTHESIA EPIDURAL/SPINAL

## 2020-10-22 PROCEDURE — 0KQM0ZZ REPAIR PERINEUM MUSCLE, OPEN APPROACH: ICD-10-PCS | Performed by: OBSTETRICS & GYNECOLOGY

## 2020-10-22 PROCEDURE — 63600000 HC DRUGS/DETAIL CODE: Performed by: OBSTETRICS & GYNECOLOGY

## 2020-10-22 PROCEDURE — 72000012 HC VAGINAL DELIVERY LEVEL 2

## 2020-10-22 PROCEDURE — 25000000 HC PHARMACY GENERAL: Performed by: OBSTETRICS & GYNECOLOGY

## 2020-10-22 PROCEDURE — 63600000 HC DRUGS/DETAIL CODE: Performed by: STUDENT IN AN ORGANIZED HEALTH CARE EDUCATION/TRAINING PROGRAM

## 2020-10-22 PROCEDURE — 25000000 HC PHARMACY GENERAL

## 2020-10-22 PROCEDURE — 10907ZC DRAINAGE OF AMNIOTIC FLUID, THERAPEUTIC FROM PRODUCTS OF CONCEPTION, VIA NATURAL OR ARTIFICIAL OPENING: ICD-10-PCS | Performed by: OBSTETRICS & GYNECOLOGY

## 2020-10-22 PROCEDURE — 63700000 HC SELF-ADMINISTRABLE DRUG: Performed by: OBSTETRICS & GYNECOLOGY

## 2020-10-22 RX ORDER — OXYCODONE AND ACETAMINOPHEN 5; 325 MG/1; MG/1
1-2 TABLET ORAL EVERY 4 HOURS PRN
Status: DISCONTINUED | OUTPATIENT
Start: 2020-10-22 | End: 2020-10-24 | Stop reason: HOSPADM

## 2020-10-22 RX ORDER — ALUMINUM HYDROXIDE, MAGNESIUM HYDROXIDE, AND SIMETHICONE 1200; 120; 1200 MG/30ML; MG/30ML; MG/30ML
30 SUSPENSION ORAL EVERY 4 HOURS PRN
Status: DISCONTINUED | OUTPATIENT
Start: 2020-10-22 | End: 2020-10-24 | Stop reason: HOSPADM

## 2020-10-22 RX ORDER — AMOXICILLIN 250 MG
1 CAPSULE ORAL 2 TIMES DAILY
Status: DISCONTINUED | OUTPATIENT
Start: 2020-10-22 | End: 2020-10-24 | Stop reason: HOSPADM

## 2020-10-22 RX ORDER — OXYTOCIN/0.9 % SODIUM CHLORIDE 20/1000 ML
PLASTIC BAG, INJECTION (ML) INTRAVENOUS CONTINUOUS
Status: DISCONTINUED | OUTPATIENT
Start: 2020-10-22 | End: 2020-10-22

## 2020-10-22 RX ORDER — ACETAMINOPHEN 325 MG/1
650 TABLET ORAL EVERY 4 HOURS PRN
Status: DISCONTINUED | OUTPATIENT
Start: 2020-10-22 | End: 2020-10-24 | Stop reason: HOSPADM

## 2020-10-22 RX ORDER — CALCIUM CARBONATE 200(500)MG
500 TABLET,CHEWABLE ORAL EVERY 4 HOURS PRN
Status: DISCONTINUED | OUTPATIENT
Start: 2020-10-22 | End: 2020-10-24 | Stop reason: HOSPADM

## 2020-10-22 RX ORDER — OXYTOCIN/0.9 % SODIUM CHLORIDE 20/1000 ML
PLASTIC BAG, INJECTION (ML) INTRAVENOUS
Status: COMPLETED
Start: 2020-10-22 | End: 2020-10-22

## 2020-10-22 RX ORDER — OXYTOCIN/0.9 % SODIUM CHLORIDE 30/500 ML
0-20 PLASTIC BAG, INJECTION (ML) INTRAVENOUS CONTINUOUS
Status: DISCONTINUED | OUTPATIENT
Start: 2020-10-22 | End: 2020-10-22

## 2020-10-22 RX ORDER — ONDANSETRON 4 MG/1
4 TABLET, ORALLY DISINTEGRATING ORAL EVERY 6 HOURS PRN
Status: DISCONTINUED | OUTPATIENT
Start: 2020-10-22 | End: 2020-10-24 | Stop reason: HOSPADM

## 2020-10-22 RX ORDER — IBUPROFEN 600 MG/1
600 TABLET ORAL EVERY 6 HOURS PRN
Status: DISCONTINUED | OUTPATIENT
Start: 2020-10-22 | End: 2020-10-24 | Stop reason: HOSPADM

## 2020-10-22 RX ORDER — OXYTOCIN/0.9 % SODIUM CHLORIDE 20/1000 ML
500 PLASTIC BAG, INJECTION (ML) INTRAVENOUS ONCE
Status: COMPLETED | OUTPATIENT
Start: 2020-10-22 | End: 2020-10-22

## 2020-10-22 RX ORDER — SODIUM CHLORIDE, SODIUM LACTATE, POTASSIUM CHLORIDE, CALCIUM CHLORIDE 600; 310; 30; 20 MG/100ML; MG/100ML; MG/100ML; MG/100ML
125 INJECTION, SOLUTION INTRAVENOUS CONTINUOUS
Status: DISCONTINUED | OUTPATIENT
Start: 2020-10-22 | End: 2020-10-22

## 2020-10-22 RX ORDER — DIBUCAINE 1 %
1 OINTMENT (GRAM) TOPICAL AS NEEDED
Status: DISCONTINUED | OUTPATIENT
Start: 2020-10-22 | End: 2020-10-24 | Stop reason: HOSPADM

## 2020-10-22 RX ORDER — LIDOCAINE HYDROCHLORIDE AND EPINEPHRINE 15; 5 MG/ML; UG/ML
INJECTION, SOLUTION EPIDURAL
Status: COMPLETED | OUTPATIENT
Start: 2020-10-22 | End: 2020-10-22

## 2020-10-22 RX ADMIN — Medication: at 20:57

## 2020-10-22 RX ADMIN — IBUPROFEN 600 MG: 600 TABLET ORAL at 22:35

## 2020-10-22 RX ADMIN — OXYTOCIN-SODIUM CHLORIDE 0.9% IV SOLN 30 UNIT/500ML 1 MILLI-UNITS/MIN: 30-0.9/5 SOLUTION at 08:49

## 2020-10-22 RX ADMIN — SODIUM CHLORIDE, POTASSIUM CHLORIDE, SODIUM LACTATE AND CALCIUM CHLORIDE 125 ML/HR: 600; 310; 30; 20 INJECTION, SOLUTION INTRAVENOUS at 04:10

## 2020-10-22 RX ADMIN — LIDOCAINE HYDROCHLORIDE,EPINEPHRINE BITARTRATE 5 ML: 15; .005 INJECTION, SOLUTION EPIDURAL; INFILTRATION; INTRACAUDAL; PERINEURAL at 17:46

## 2020-10-22 RX ADMIN — Medication 10 UNITS: at 19:46

## 2020-10-22 RX ADMIN — SODIUM CHLORIDE, POTASSIUM CHLORIDE, SODIUM LACTATE AND CALCIUM CHLORIDE 125 ML/HR: 600; 310; 30; 20 INJECTION, SOLUTION INTRAVENOUS at 11:03

## 2020-10-22 RX ADMIN — SODIUM CHLORIDE, POTASSIUM CHLORIDE, SODIUM LACTATE AND CALCIUM CHLORIDE 125 ML/HR: 600; 310; 30; 20 INJECTION, SOLUTION INTRAVENOUS at 01:01

## 2020-10-22 RX ADMIN — SODIUM CHLORIDE, POTASSIUM CHLORIDE, SODIUM LACTATE AND CALCIUM CHLORIDE 125 ML/HR: 600; 310; 30; 20 INJECTION, SOLUTION INTRAVENOUS at 17:50

## 2020-10-22 RX ADMIN — ROPIVACAINE HYDROCHLORIDE 50 ML: 2 INJECTION, SOLUTION EPIDURAL; INFILTRATION at 17:36

## 2020-10-22 NOTE — PROGRESS NOTES
"Intrapartum Note:    S:  Patient without complaints at this time.  +FM    O:   Visit Vitals  /69   Pulse 69   Temp 37.1 °C (98.7 °F) (Oral)   Resp 18   Ht 1.702 m (5' 7\")   Wt 76.2 kg (168 lb)   LMP 2020   SpO2 99%   Breastfeeding No   BMI 26.31 kg/m²       FHT: 140/moderate variability/+accels/no decels CAT 1/scalp stim with exam    TOCO:irregular 3- 8 min    Latest cervical exam:  Cervical Dilation (cm): 3-4   Effacement - 50%  Station - -2/-3        Method: sterile exam per physician (10/22/20 0755)      A/P: 29 y.o.  for IOL     Restart pitocin   Monitor closely      DO CRISTINA Edge (dist.)  Beeper [address]  Cell  [phone]  10/22/2020  7:54 AM  "

## 2020-10-22 NOTE — PROGRESS NOTES
"Intrapartum Note:    S:  Patient without complaints at this time.  +FM.  Feels crampy with contractions    O:   Visit Vitals  /74   Pulse 82   Temp 37 °C (98.6 °F) (Oral)   Resp 20   Ht 1.702 m (5' 7\")   Wt 76.2 kg (168 lb)   LMP 2020   SpO2 99%   Breastfeeding No   BMI 26.31 kg/m²       FHT: 140 /moderate variability/+accels/no decels CAT 1    TOCO:q 2min    Latest cervical exam:  Cervical Dilation (cm): 4-5  Cervical Effacement: 70  Fetal Station: -1  Method: sterile exam per physician (10/22/20 5763)      A/P: 29 y.o.  for IOL now transitioning to active labor    AROM done - clear  Continue pitocin  Active management  Anticipate       DO WARNER EdgeG (dist.)  Beeper [address]  Cell  [phone]  10/22/2020  1:56 PM  "

## 2020-10-22 NOTE — ANESTHESIOLOGIST PRE-PROCEDURE ATTESTATION
Pre-Procedure Patient Identification:  I am the Primary Anesthesiologist and have identified the patient on 10/22/20 at 5:45 PM.   I have confirmed the following procedure(s) * No procedures listed * will be performed by the following surgeon/proceduralist * No surgeons listed *.

## 2020-10-22 NOTE — ANESTHESIA PREPROCEDURE EVALUATION
"        29f for labor epidural.    Estimated body mass index is 26.31 kg/m² as calculated from the following:    Height as of this encounter: 1.702 m (5' 7\").    Weight as of this encounter: 76.2 kg (168 lb).    No Known Allergies      Anesthesia ROS/MED HX    Anesthesia History - neg  Pulmonary - neg  Neuro/Psych - neg  Cardiovascular- neg  Hematological - neg  GI/Hepatic- neg  Musculoskeletal- neg  Renal Disease- neg  Endo/Other- neg      Relevant Problems   No relevant active problems     Lab Results   Component Value Date    WBC 13.17 (H) 10/21/2020    HGB 9.1 (L) 10/21/2020    HCT 28.6 (L) 10/21/2020    MCV 80.3 (L) 10/21/2020     10/21/2020       Lab Results   Component Value Date    GLUCOSE 83 03/19/2020    CALCIUM 9.5 03/19/2020     03/19/2020    K 3.9 03/19/2020    CO2 23 03/19/2020     03/19/2020    BUN 7 (L) 03/19/2020    CREATININE 0.7 03/19/2020       No results found for: INR, PROTIME    No results found for: PTT      3/19/20 ekg  Sinus bradycardia with sinus arrhythmia   Rightward axis   Confirmed by ORAL TRACY ([id number]) on 3/20/2020 5:10:53 PM    Physical Exam    Airway   Mallampati: III   TM distance: >3 FB   Neck ROM: full  Cardiovascular    Rhythm: regular   Rate: normalPulmonary    clear to auscultation  Dental - normal        Anesthesia Plan    Plan: epidural   ASA 2    "

## 2020-10-22 NOTE — PROGRESS NOTES
"Intrapartum Note:    S:  Patient without complaints at this time.  +FM.  Recently got an epidural    O:   Visit Vitals  /82   Pulse 95   Temp 36.6 °C (97.8 °F) (Oral)   Resp 20   Ht 1.702 m (5' 7\")   Wt 76.2 kg (168 lb)   LMP 2020   SpO2 98%   Breastfeeding No   BMI 26.31 kg/m²       FHT: 140 /moderate variability/+accels/no decels CAT 1    TOCO:q 2min    Latest cervical exam:  Cervical Dilation (cm): 6-7  Cervical Effacement: 90  Fetal Station: -1  Method: sterile exam per physician (10/22/20 1145)      A/P: 29 y.o.  for IOL now in active labor    Continue pitocin  Active management  Anticipate       DO CRISTINA Edge (dist.)  Beeper [address]  Cell  [phone]  10/22/2020  5:44 PM  "

## 2020-10-22 NOTE — NURSING NOTE
pitocin rate reduced to 5mu/min.  Anabella placed by Dr Stubbs in vaginal.  Pt turned to side and placed on peanut ball to labor down per Dr Stubbs.  Pushing to resume in 15-20minutes per Dr Stubbs

## 2020-10-22 NOTE — NURSING NOTE
Pt arrived to LDR 7. Pt denies chest pain, dizziness, SOB, leakage of fluids and ctx. Pt endorses positive fetal movement. Pt consents signed and pt placed on EFM. Dr. Edmond notified of admission.

## 2020-10-22 NOTE — H&P
History and Physical     CC: Induction of labor    HPI:  29 y.o.  at 39w3d presents for induction of labor.  She has a history of precipitous delivery with her second delivery in 2016.  She states this pregnancy is uncomplicated other than a thickened NT which was ruled out for chromosomal abnormality per a [facility].      ROS: As above, otherwise negative    Complications:  Thickened NT  Anemia in pregnancy    Prenatal Labs: O pos/GBS neg    Obhx:   OB History    Para Term  AB Living   3 2 2     2   SAB TAB Ectopic Multiple Live Births           2      # Outcome Date GA Lbr Ventura/2nd Weight Sex Delivery Anes PTL Lv   3 Current            2 Term 12/10/16    M  None N CHYNA   1 Term 14    F Vag-Spont EPI N CHYNA       Gynehx: Denies     Medhx: History reviewed. No pertinent past medical history.    Surghx:   Past Surgical History:   Procedure Laterality Date   • CYST REMOVAL      on heart   • WISDOM TOOTH EXTRACTION         Meds:   No current facility-administered medications on file prior to encounter.      Current Outpatient Medications on File Prior to Encounter   Medication Sig Dispense Refill   • prenatal 21-iron fu-folic acid 14 mg iron- 400 mcg tablet Take 1 tablet by mouth daily.     • clindamycin (CLINDAGEL) 1 % gel          All: No Known Allergies    Sochx:   Social History     Socioeconomic History   • Marital status: Single     Spouse name: Not on file   • Number of children: Not on file   • Years of education: Not on file   • Highest education level: Not on file   Occupational History   • Not on file   Social Needs   • Financial resource strain: Not hard at all   • Food insecurity     Worry: Never true     Inability: Never true   • Transportation needs     Medical: No     Non-medical: No   Tobacco Use   • Smoking status: Never Smoker   • Smokeless tobacco: Never Used   Substance and Sexual Activity   • Alcohol use: Not Currently   • Drug use: Never   • Sexual activity: Yes   Lifestyle   •  "Physical activity     Days per week: Not on file     Minutes per session: Not on file   • Stress: Not on file   Relationships   • Social connections     Talks on phone: Not on file     Gets together: Not on file     Attends Rastafari service: Not on file     Active member of club or organization: Not on file     Attends meetings of clubs or organizations: Not on file     Relationship status: Not on file   • Intimate partner violence     Fear of current or ex partner: Not on file     Emotionally abused: Not on file     Physically abused: Not on file     Forced sexual activity: Not on file   Other Topics Concern   • Not on file   Social History Narrative   • Not on file       Famhx:   Family History   Problem Relation Age of Onset   • Diabetes Paternal Grandfather    • Diabetes Maternal Grandmother        Physical Exam:    Visit Vitals  Ht 1.702 m (5' 7\")   Wt 76.2 kg (168 lb)   LMP 2020   BMI 26.31 kg/m²     AAOx3, NAD  CV:RRR  L: No labored respirations  Abd: Gravid, non-tender  Ex: No edema, NT    SVE:3-4/80/-3    FHT:  160/moderate variability/+accels/no decels Cat 1  TOCO: irregular    CBC Results       10/21/20 03/19/20 12/10/16                    2006 1734 0644         WBC 13.17 8.30 17.84         RBC 3.56 4.26 4.15         HGB 9.1 12.3 11.2         HCT 28.6 36.7 33.7         MCV 80.3 86.2 81.2         MCH 25.6 28.9 27.0         MCHC 31.8 33.5 33.2          230 258                     Covid Neg    A/P: 29 y.o.  at 39w3d presents for induction of labor for history precipitous delivery, GBS neg    Admit to L&D  NPO, IVF  CFM, TOCO  Low dose pitocin  AROM with regular contractions    Jessy Edomnd DO    Obstetrics and Gynecology  [facility]  In hospital pager #[phone]  Office # [phone]                      "

## 2020-10-23 LAB
ERYTHROCYTE [DISTWIDTH] IN BLOOD BY AUTOMATED COUNT: 14.4 % (ref 11.7–14.4)
HCT VFR BLDCO AUTO: 20 % (ref 35–45)
HGB BLD-MCNC: 6.4 G/DL (ref 11.8–15.7)
MCH RBC QN AUTO: 25.6 PG (ref 28–33.2)
MCHC RBC AUTO-ENTMCNC: 32 G/DL (ref 32.2–35.5)
MCV RBC AUTO: 80 FL (ref 83–98)
PDW BLD AUTO: 12 FL (ref 9.4–12.3)
PLATELET # BLD AUTO: 180 K/UL (ref 150–369)
RBC # BLD AUTO: 2.5 M/UL (ref 3.93–5.22)
WBC # BLD AUTO: 13.88 K/UL (ref 3.8–10.5)

## 2020-10-23 PROCEDURE — 85027 COMPLETE CBC AUTOMATED: CPT | Performed by: OBSTETRICS & GYNECOLOGY

## 2020-10-23 PROCEDURE — 36415 COLL VENOUS BLD VENIPUNCTURE: CPT | Performed by: OBSTETRICS & GYNECOLOGY

## 2020-10-23 PROCEDURE — 12000000 HC ROOM AND CARE MED/SURG

## 2020-10-23 PROCEDURE — 63700000 HC SELF-ADMINISTRABLE DRUG: Performed by: OBSTETRICS & GYNECOLOGY

## 2020-10-23 RX ORDER — AMOXICILLIN 250 MG
1 CAPSULE ORAL 2 TIMES DAILY
Qty: 179 TABLET | Refills: 0 | Status: SHIPPED | OUTPATIENT
Start: 2020-10-23 | End: 2021-01-21

## 2020-10-23 RX ORDER — IBUPROFEN 600 MG/1
600 TABLET ORAL EVERY 6 HOURS PRN
Qty: 30 TABLET | Refills: 0 | Status: SHIPPED | OUTPATIENT
Start: 2020-10-23 | End: 2022-04-19

## 2020-10-23 RX ORDER — ACETAMINOPHEN 325 MG/1
650 TABLET ORAL EVERY 4 HOURS PRN
Qty: 30 TABLET | Refills: 0 | Status: SHIPPED | OUTPATIENT
Start: 2020-10-23 | End: 2020-11-22

## 2020-10-23 RX ORDER — FERROUS SULFATE 325(65) MG
325 TABLET ORAL 2 TIMES DAILY WITH MEALS
Qty: 60 TABLET | Refills: 0 | Status: ON HOLD | OUTPATIENT
Start: 2020-10-23 | End: 2022-11-06

## 2020-10-23 RX ORDER — FERROUS SULFATE 325(65) MG
325 TABLET ORAL 2 TIMES DAILY WITH MEALS
Status: DISCONTINUED | OUTPATIENT
Start: 2020-10-23 | End: 2020-10-24 | Stop reason: HOSPADM

## 2020-10-23 RX ADMIN — IBUPROFEN 600 MG: 600 TABLET ORAL at 18:04

## 2020-10-23 RX ADMIN — DOCUSATE SODIUM AND SENNOSIDES 1 TABLET: 8.6; 5 TABLET, FILM COATED ORAL at 19:54

## 2020-10-23 RX ADMIN — DOCUSATE SODIUM AND SENNOSIDES 1 TABLET: 8.6; 5 TABLET, FILM COATED ORAL at 08:35

## 2020-10-23 RX ADMIN — PRENATAL VIT W/ FE FUMARATE-FA TAB 27-0.8 MG 1 TABLET: 27-0.8 TAB at 08:34

## 2020-10-23 RX ADMIN — IBUPROFEN 600 MG: 600 TABLET ORAL at 08:35

## 2020-10-23 RX ADMIN — FERROUS SULFATE TAB 325 MG (65 MG ELEMENTAL FE) 325 MG: 325 (65 FE) TAB at 18:38

## 2020-10-23 NOTE — PROGRESS NOTES
Patient wihtout dizzyness or lightheadedness today after walking around. Declines transfusion. Will begin FESO4 BID and check CBC tomorrow.    Zabrina Chacon MD  Obstetrics and Gynecology  [facility]  Office phone # [phone]  In hospital pager # [phone]

## 2020-10-23 NOTE — PLAN OF CARE
Plan of Care Review  Plan of Care Reviewed With: guardian, patient  Delivery of viable male infant.  Vaginal sweep completed.  Surgicount correct.  EBL 700ml including blood QBL from pre delivery.  Bladder emptied by Dr Stubbs at delivery

## 2020-10-23 NOTE — DISCHARGE SUMMARY
Inpatient Discharge Summary    BRIEF OVERVIEW  Admitting Provider: Terry Silva DO  Discharge Provider: Terry Silva DO  Primary Care Physician at Discharge: Sunita Man [phone]     Admission Date: 10/21/2020     Discharge Date: 10/24/2020    Primary Discharge Diagnosis  Term pregnancy    Secondary Discharge Diagnosis  S/p   Acute blood loss anemia    Discharge Disposition  Home     Discharge Medications     Medication List      START taking these medications    acetaminophen 325 mg tablet  Commonly known as: TYLENOL  Take 2 tablets (650 mg total) by mouth every 4 (four) hours as needed for mild pain.  Dose: 650 mg     ferrous sulfate 325 mg (65 mg iron) tablet  Take 1 tablet (325 mg total) by mouth 2 (two) times a day with meals.  Dose: 325 mg     ibuprofen 600 mg tablet  Commonly known as: MOTRIN  Take 1 tablet (600 mg total) by mouth every 6 (six) hours as needed for mild pain.  Dose: 600 mg     sennosides-docusate sodium 8.6-50 mg  Commonly known as: SENOKOT-S  Take 1 tablet by mouth 2 (two) times a day for 179 doses.  Dose: 1 tablet        CONTINUE taking these medications    clindamycin 1 % gel  Commonly known as: CLINDAGEL     prenatal 21-iron fu-folic acid 14 mg iron- 400 mcg tablet  Take 1 tablet by mouth daily.  Dose: 1 tablet            Active Issues Requiring Follow-up  Issue: postpartum  Responsible Individual: appointment in 6 weeks  What is Needed: patient  Follow-up Appointments Arranged: No     Outpatient Follow-Up  Encounter Information     You do not currently have any appointments scheduled.          Test Results Pending at Discharge      DETAILS OF HOSPITAL STAY    Presenting Problem/History of Present Illness  Term pregnancy [Z34.90]      Hospital Course/Operative Procedures Performed

## 2020-10-23 NOTE — NURSING NOTE
Partialbath provided.  Pt gown changed and prepped for transfer to [facility]

## 2020-10-23 NOTE — DISCHARGE INSTRUCTIONS
"POSTPARTUM DISCHARGE INSTRUCTIONS      If you had a vaginal delivery: Call for postpartum  appointment with any of our physicians in 6wks.     Activities/Restrictions:    -No driving for 7-14 days.  No driving if taking Norco for pain.   -No baths or swimming for 6 weeks.  Showers only.   -No tampons, douching, intercourse for 6 weeks   -No heavy lifting more than the baby for 6 weeks.    -No heavy exercise for 6 weeks.      Medications:   -Please resume prenatal vitamins if you are breast feeding  -Colace 100mg take one tablet by mouth daily as needed for constipation. This is an over-the-counter medication.   -For hemorrhoids, use Tucks pads, Preparation H, Proctofoam as needed.  -For cracked/sore nipples you may use Lanolin cream.    Please resume your general diet.     To help with and episiotomy or vaginal laceration disomfort, you may:  1. Apply cold packs or chilled witch-marie pads to the area  2. Take sitz baths (soaking in a few inches of warm water will help)  3. Use over the counter Dermaplast spray  4. Apply warm water to the area after urination using a squeeze water bottle  5. Always wipe from front to back to prevent infection        Please call the office if you have:  1. Heavy vaginal bleeding (soaking more than one pad per hour or passing clots larger than an egg)  2. Increasing redness or swelling at or near your incision or episiotomy site  3. Inability to tolerate food or drink without vomiting  4. Opening, bleeding, discharge or separation of your incision or episiotomy site  5. Foul smelling discharge  6. Chills or fever over 100.4 degrees Fahrenheit  7. Increasing pain (not relieved by pain medication)  8. Headache unrelieved by \"pain meds\"  9. New calf pain especially if only on one side  10. Unrelieved feelings of:  Inability to cope  Sadness  Anxiety  Lack of interest in baby  Insomnia  Crying    "

## 2020-10-23 NOTE — PROGRESS NOTES
[facility] - Mom has an option of mom baby nurse visits per her insurance.We are a Tier 3 provider and mom has a 100.00 coinsurance because of this. Spoke to mom and she declined the visit.

## 2020-10-23 NOTE — ANESTHESIA POSTPROCEDURE EVALUATION
Patient: Shreya De Jesus    Procedure Summary     Date: 10/22/20 Room / Location:     Anesthesia Start: 1745 Anesthesia Stop: 1943    Procedure: Labor Analgesia Diagnosis:     Scheduled Providers:  Responsible Provider: Marlon Garcia MD    Anesthesia Type: epidural ASA Status: 2          Anesthesia Type: epidural  PACU Vitals     No data found in the last 10 encounters.            Anesthesia Post Evaluation    Pain management: adequate  Patient location during evaluation: floor  Patient participation: complete - patient participated  Level of consciousness: awake and alert  Cardiovascular status: acceptable  Airway Patency: adequate  Respiratory status: acceptable  Hydration status: acceptable  Anesthetic complications: no

## 2020-10-23 NOTE — L&D DELIVERY NOTE
OB Vaginal Delivery Note    Delivery:10/22/2020 at 7:43 PM   Patient:Shreya De Jesus  :1990     Review the Delivery Report for details.     Delivery Details    Pre-Op Diagnosis: 1. 29 y.o.  intrauterine pregnancy at 39w4d with chen gestation.  2. H/o precipitous delivery   vulvar varicosity       Post-Op Diagnosis: 1. same   Delivery Clinician: Zabrina Stubbs    Delivery Assist;Delivery Nurse;Nursery Nurse  ;Sloane Blanco;Mary Garcia    Delivery Type: Vaginal, Spontaneous    Labor Complications: Other (comment)    EBL: 700  mL   Anesthesia Type: Epidural    Placenta Delivery Expressed    Placenta Disposition: discarded    Additional Specimens Cord Blood      INFANT INFORMATION  Time of Birth:7:43 PM   Presentation: Vertex   Position:Left , ,Anterior   Cord:  ,Complications:Nuchal    Sex: male    Weight:       1 Minute 5 Minute 10 Minute   Apgar Totals: 8   9          Information for the patient's :  Simon De Jesus [[id number]]     [place] Cord Gas     None           Delivery Details:    Shreya De Jesus is a 29 y.o.  at 39w4d gestation who presented to the hospital for Term pregnancy [Z34.90].  Her labor was induced  oxytocin.  The patient progressed to fully dilated and pushed for  hours and   minutes.  A viable  male  infant was delivered by Vaginal, Spontaneous  from Left , ,Anterior . there was a nuchal cord and she pushed through it.  The anterior and posterior shoulders delivered without difficulty followed by the remainder of the infant. A spontaneous cry was heard, and the infant appeared to be moving all 4 extremities. The cord was clamped and cut with   noted.  The baby was placed on mother for skin to skin. The placenta delivered spontaneously shortly thereafter.  Fundal massage was performed and the fundus was found to be firm, and lochia was within normal limits. The perineum, vagina, cervix were inspected, and the following lacerations were noted:       Episiotomy: None    Lacerations:   Perineal: 2nd  Repaired: Yes     Periurethral:    Repaired:     Labial: right  Repaired:     Sulcus:   Repaired:     Vaginal:   Repaired:     Cervical:    Repaired:        Any lacerations were repaired in the usual fashion using 3-0 Synthetic Suture  suture.The right vulvar varicosity ruptured during pushing and she lost 500 cc blood from weighed chux, then normal 2-3 ebl  Sutured figure of 8 and hemostasis. Excellent hemostasis was noted. At the completion of the case, sponge and needle counts were correct. A bimanual exam revealed no evidence of retained products of conception. The infant and patient were left in the delivery room in stable condition.     Attending Attestation: I performed the procedure.    Zabrina Stubbs MD

## 2020-10-23 NOTE — PROGRESS NOTES
Obstetrics Postpartum Progress Note    Events  No acute events overnight.    Subjective  Pain: no  Bleeding: lochia minimal  Diet: taking regular diet  Voiding: without difficulty  Ambulating: as tolerated    Vitals  Temp:  [36.6 °C (97.8 °F)-37.6 °C (99.7 °F)] 36.6 °C (97.9 °F)  Heart Rate:  [] 78  Resp:  [18-20] 18  BP: ()/(54-96) 123/77    I&O    Intake/Output Summary (Last 24 hours) at 10/23/2020 0845  Last data filed at 10/23/2020 0400  Gross per 24 hour   Intake 2350 ml   Output 2655 ml   Net -305 ml       Physical Exam  General: well and resting  Abdomen: soft, nondistended, non-tender  Fundus: firm, below umbilicus and nontender  Perineum: deferred  Extremities: symmetric and no edema    Labs  Labs Reviewed:  Lab Results   Component Value Date    ABO O 10/21/2020    LABRH Positive 10/21/2020        CBC Results       10/23/20 10/21/20 03/19/20                    0635  1734         WBC 13.88 13.17 8.30         RBC 2.50 3.56 4.26         HGB 6.4 9.1 12.3         HCT 20.0 28.6 36.7         MCV 80.0 80.3 86.2         MCH 25.6 25.6 28.9         MCHC 32.0 31.8 33.5          227 230         Comment for WBC at 0635 on 10/23/20: ALL RESULTS HAVE BEEN CHECKED    Comment for HGB at 0635 on 10/23/20: This result has been called to SHREYA HUNTER by Shirley Cox on 10 23 20 at 07:20, and has been read back.             Assessment/Plan   Problem-based Assessment and Plan    Shreya De Jesus is a 29 y.o.  postpartum day 1 s/p Vaginal, Spontaneous .    1. Vital Signs: not tachycardic, not hypotensive  2. Hemodynamics: anemia without signs of hemodynamic instability  3. Pain: controlled  4. VTE Assessment: Early Ambulation  5. Vaccinations/Rhogam: rhogam not indicated   6. Postpartum care: continue routine postpartum care   7. Acute blood loss anemia - patient states she is asymptomatic currently but has only been OOB twice. Encouraged ambulation to see if transfusion needed for symptomatic  anemia. Patient declines transfusion currently      Zabrina Chacon MD

## 2020-10-23 NOTE — PLAN OF CARE
Problem: Adult Inpatient Plan of Care  Goal: Plan of Care Review  Outcome: Progressing  Goal: Patient-Specific Goal (Individualization)  Outcome: Progressing  Goal: Absence of Hospital-Acquired Illness or Injury  Outcome: Progressing  Goal: Optimal Comfort and Wellbeing  Outcome: Progressing  Goal: Readiness for Transition of Care  Outcome: Progressing   Plan of Care Review  Plan of Care Reviewed With: patient, guardian

## 2020-10-24 VITALS
RESPIRATION RATE: 18 BRPM | OXYGEN SATURATION: 97 % | BODY MASS INDEX: 26.37 KG/M2 | SYSTOLIC BLOOD PRESSURE: 129 MMHG | HEIGHT: 67 IN | HEART RATE: 93 BPM | WEIGHT: 168 LBS | TEMPERATURE: 99.1 F | DIASTOLIC BLOOD PRESSURE: 82 MMHG

## 2020-10-24 LAB
ERYTHROCYTE [DISTWIDTH] IN BLOOD BY AUTOMATED COUNT: 14.6 % (ref 11.7–14.4)
HCT VFR BLDCO AUTO: 19.6 % (ref 35–45)
HGB BLD-MCNC: 6.2 G/DL (ref 11.8–15.7)
MCH RBC QN AUTO: 25.8 PG (ref 28–33.2)
MCHC RBC AUTO-ENTMCNC: 31.6 G/DL (ref 32.2–35.5)
MCV RBC AUTO: 81.7 FL (ref 83–98)
PDW BLD AUTO: 11.3 FL (ref 9.4–12.3)
PLATELET # BLD AUTO: 181 K/UL (ref 150–369)
RBC # BLD AUTO: 2.4 M/UL (ref 3.93–5.22)
WBC # BLD AUTO: 12.15 K/UL (ref 3.8–10.5)

## 2020-10-24 PROCEDURE — 63600000 HC DRUGS/DETAIL CODE: Performed by: OBSTETRICS & GYNECOLOGY

## 2020-10-24 PROCEDURE — G0008 ADMIN INFLUENZA VIRUS VAC: HCPCS | Performed by: OBSTETRICS & GYNECOLOGY

## 2020-10-24 PROCEDURE — 85027 COMPLETE CBC AUTOMATED: CPT | Performed by: OBSTETRICS & GYNECOLOGY

## 2020-10-24 PROCEDURE — 90686 IIV4 VACC NO PRSV 0.5 ML IM: CPT | Performed by: OBSTETRICS & GYNECOLOGY

## 2020-10-24 PROCEDURE — 36415 COLL VENOUS BLD VENIPUNCTURE: CPT | Performed by: OBSTETRICS & GYNECOLOGY

## 2020-10-24 PROCEDURE — 63700000 HC SELF-ADMINISTRABLE DRUG: Performed by: OBSTETRICS & GYNECOLOGY

## 2020-10-24 RX ADMIN — PRENATAL VIT W/ FE FUMARATE-FA TAB 27-0.8 MG 1 TABLET: 27-0.8 TAB at 08:19

## 2020-10-24 RX ADMIN — IBUPROFEN 600 MG: 600 TABLET ORAL at 08:19

## 2020-10-24 RX ADMIN — FERROUS SULFATE TAB 325 MG (65 MG ELEMENTAL FE) 325 MG: 325 (65 FE) TAB at 08:19

## 2020-10-24 RX ADMIN — DOCUSATE SODIUM AND SENNOSIDES 1 TABLET: 8.6; 5 TABLET, FILM COATED ORAL at 08:19

## 2020-10-24 RX ADMIN — INFLUENZA VIRUS VACCINE 60 MCG: 15; 15; 15; 15 SUSPENSION INTRAMUSCULAR at 08:21

## 2022-03-09 LAB
GP B STREP SPEC QL CULT: NORMAL
HBV SURFACE AG SER QL: NONREACTIVE
HCV AB SER QL: NONREACTIVE
HIV 1+2 AB+HIV1 P24 AG SERPL QL IA: NONREACTIVE
QST CHLAMYDIA TRACHOMATIS RNA, TMA: NEGATIVE
QST NEISSERIA GONORRHOEAE RNA, TMA: NEGATIVE
T PALLIDUM AB SER QL IF: NONREACTIVE

## 2022-03-17 ENCOUNTER — TRANSCRIBE ORDERS (OUTPATIENT)
Dept: SCHEDULING | Age: 32
End: 2022-03-17

## 2022-03-17 DIAGNOSIS — Z36.82 ENCOUNTER FOR ANTENATAL SCREENING FOR NUCHAL TRANSLUCENCY: Primary | ICD-10-CM

## 2022-04-19 ENCOUNTER — HOSPITAL ENCOUNTER (OUTPATIENT)
Dept: PERINATAL CARE | Facility: HOSPITAL | Age: 32
Discharge: HOME | End: 2022-04-19
Attending: NURSE PRACTITIONER
Payer: COMMERCIAL

## 2022-04-19 DIAGNOSIS — O36.80X0 PREGNANCY WITH INCONCLUSIVE FETAL VIABILITY, NOT APPLICABLE OR UNSPECIFIED FETUS: ICD-10-CM

## 2022-04-19 DIAGNOSIS — Z36.82 ENCOUNTER FOR ANTENATAL SCREENING FOR NUCHAL TRANSLUCENCY: Primary | ICD-10-CM

## 2022-04-19 DIAGNOSIS — Z36.3 ANTENATAL SCREENING FOR MALFORMATION USING ULTRASONICS: ICD-10-CM

## 2022-04-19 DIAGNOSIS — Z3A.12 12 WEEKS GESTATION OF PREGNANCY: ICD-10-CM

## 2022-04-19 PROCEDURE — 76813 OB US NUCHAL MEAS 1 GEST: CPT

## 2022-04-19 PROCEDURE — 76801 OB US < 14 WKS SINGLE FETUS: CPT

## 2022-05-04 ENCOUNTER — TRANSCRIBE ORDERS (OUTPATIENT)
Dept: SCHEDULING | Age: 32
End: 2022-05-04

## 2022-05-04 DIAGNOSIS — Z3A.20 20 WEEKS GESTATION OF PREGNANCY: ICD-10-CM

## 2022-05-04 DIAGNOSIS — O35.9XX0 MATERNAL CARE FOR (SUSPECTED) FETAL ABNORMALITY AND DAMAGE, UNSPECIFIED, NOT APPLICABLE OR UNSPECIFIED: ICD-10-CM

## 2022-05-04 DIAGNOSIS — Z36.3 ENCOUNTER FOR ANTENATAL SCREENING FOR MALFORMATIONS: Primary | ICD-10-CM

## 2022-05-04 DIAGNOSIS — Z36.9 ENCOUNTER FOR ANTENATAL SCREENING OF MOTHER: ICD-10-CM

## 2022-06-13 ENCOUNTER — HOSPITAL ENCOUNTER (OUTPATIENT)
Dept: PERINATAL CARE | Facility: HOSPITAL | Age: 32
Discharge: HOME | End: 2022-06-13
Attending: OBSTETRICS & GYNECOLOGY
Payer: COMMERCIAL

## 2022-06-13 DIAGNOSIS — Z3A.20 20 WEEKS GESTATION OF PREGNANCY: ICD-10-CM

## 2022-06-13 DIAGNOSIS — O35.9XX0 MATERNAL CARE FOR (SUSPECTED) FETAL ABNORMALITY AND DAMAGE, UNSPECIFIED, NOT APPLICABLE OR UNSPECIFIED: Primary | ICD-10-CM

## 2022-06-13 DIAGNOSIS — Z36.3 ENCOUNTER FOR ANTENATAL SCREENING FOR MALFORMATIONS: ICD-10-CM

## 2022-06-13 PROCEDURE — 76805 OB US >/= 14 WKS SNGL FETUS: CPT

## 2022-10-05 LAB — GP B STREP SPEC QL CULT: NORMAL

## 2022-11-06 ENCOUNTER — HOSPITAL ENCOUNTER (INPATIENT)
Facility: HOSPITAL | Age: 32
LOS: 2 days | Discharge: HOME | End: 2022-11-08
Attending: OBSTETRICS & GYNECOLOGY | Admitting: STUDENT IN AN ORGANIZED HEALTH CARE EDUCATION/TRAINING PROGRAM
Payer: COMMERCIAL

## 2022-11-06 ENCOUNTER — ANESTHESIA EVENT (INPATIENT)
Dept: OBSTETRICS AND GYNECOLOGY | Facility: HOSPITAL | Age: 32
End: 2022-11-06
Payer: COMMERCIAL

## 2022-11-06 ENCOUNTER — ANESTHESIA (INPATIENT)
Dept: OBSTETRICS AND GYNECOLOGY | Facility: HOSPITAL | Age: 32
End: 2022-11-06
Payer: COMMERCIAL

## 2022-11-06 LAB
ABO + RH BLD: NORMAL
BLD GP AB SCN SERPL QL: NEGATIVE
D AG BLD QL: POSITIVE
ERYTHROCYTE [DISTWIDTH] IN BLOOD BY AUTOMATED COUNT: 14.9 % (ref 11.7–14.4)
HCT VFR BLDCO AUTO: 30.5 % (ref 35–45)
HGB BLD-MCNC: 9.3 G/DL (ref 11.8–15.7)
LABORATORY COMMENT REPORT: NORMAL
MCH RBC QN AUTO: 24 PG (ref 28–33.2)
MCHC RBC AUTO-ENTMCNC: 30.5 G/DL (ref 32.2–35.5)
MCV RBC AUTO: 78.6 FL (ref 83–98)
PDW BLD AUTO: 11.2 FL (ref 9.4–12.3)
PLATELET # BLD AUTO: 244 K/UL (ref 150–369)
RBC # BLD AUTO: 3.88 M/UL (ref 3.93–5.22)
SARS-COV-2 RNA RESP QL NAA+PROBE: NEGATIVE
SPECIMEN EXP DATE BLD: NORMAL
T PALLIDUM AB SER QL IF: NONREACTIVE
WBC # BLD AUTO: 12.83 K/UL (ref 3.8–10.5)

## 2022-11-06 PROCEDURE — 12000000 HC ROOM AND CARE MED/SURG

## 2022-11-06 PROCEDURE — 72000011 HC VAGINAL DELIVERY LEVEL 1

## 2022-11-06 PROCEDURE — 36415 COLL VENOUS BLD VENIPUNCTURE: CPT | Performed by: STUDENT IN AN ORGANIZED HEALTH CARE EDUCATION/TRAINING PROGRAM

## 2022-11-06 PROCEDURE — 25000000 HC PHARMACY GENERAL: Performed by: ANESTHESIOLOGY

## 2022-11-06 PROCEDURE — 86780 TREPONEMA PALLIDUM: CPT | Performed by: STUDENT IN AN ORGANIZED HEALTH CARE EDUCATION/TRAINING PROGRAM

## 2022-11-06 PROCEDURE — 63700000 HC SELF-ADMINISTRABLE DRUG: Performed by: STUDENT IN AN ORGANIZED HEALTH CARE EDUCATION/TRAINING PROGRAM

## 2022-11-06 PROCEDURE — 27200130 HC EPIDURAL ANES TRAY

## 2022-11-06 PROCEDURE — 86900 BLOOD TYPING SEROLOGIC ABO: CPT

## 2022-11-06 PROCEDURE — U0003 INFECTIOUS AGENT DETECTION BY NUCLEIC ACID (DNA OR RNA); SEVERE ACUTE RESPIRATORY SYNDROME CORONAVIRUS 2 (SARS-COV-2) (CORONAVIRUS DISEASE [COVID-19]), AMPLIFIED PROBE TECHNIQUE, MAKING USE OF HIGH THROUGHPUT TECHNOLOGIES AS DESCRIBED BY CMS-2020-01-R: HCPCS | Performed by: STUDENT IN AN ORGANIZED HEALTH CARE EDUCATION/TRAINING PROGRAM

## 2022-11-06 PROCEDURE — 85027 COMPLETE CBC AUTOMATED: CPT | Performed by: STUDENT IN AN ORGANIZED HEALTH CARE EDUCATION/TRAINING PROGRAM

## 2022-11-06 PROCEDURE — 37000005 HC ANESTHESIA EPIDURAL/SPINAL

## 2022-11-06 PROCEDURE — 0HQ9XZZ REPAIR PERINEUM SKIN, EXTERNAL APPROACH: ICD-10-PCS | Performed by: STUDENT IN AN ORGANIZED HEALTH CARE EDUCATION/TRAINING PROGRAM

## 2022-11-06 PROCEDURE — 63600000 HC DRUGS/DETAIL CODE: Performed by: STUDENT IN AN ORGANIZED HEALTH CARE EDUCATION/TRAINING PROGRAM

## 2022-11-06 RX ORDER — CARBOPROST TROMETHAMINE 250 UG/ML
250 INJECTION, SOLUTION INTRAMUSCULAR ONCE AS NEEDED
Status: DISCONTINUED | OUTPATIENT
Start: 2022-11-06 | End: 2022-11-06

## 2022-11-06 RX ORDER — AMOXICILLIN 250 MG
1 CAPSULE ORAL 2 TIMES DAILY
Status: DISCONTINUED | OUTPATIENT
Start: 2022-11-06 | End: 2022-11-08 | Stop reason: HOSPADM

## 2022-11-06 RX ORDER — LIDOCAINE HYDROCHLORIDE AND EPINEPHRINE 15; 5 MG/ML; UG/ML
INJECTION, SOLUTION EPIDURAL
Status: DISCONTINUED | OUTPATIENT
Start: 2022-11-06 | End: 2022-11-06 | Stop reason: SURG

## 2022-11-06 RX ORDER — ALUMINUM HYDROXIDE, MAGNESIUM HYDROXIDE, AND SIMETHICONE 1200; 120; 1200 MG/30ML; MG/30ML; MG/30ML
30 SUSPENSION ORAL EVERY 4 HOURS PRN
Status: DISCONTINUED | OUTPATIENT
Start: 2022-11-06 | End: 2022-11-08 | Stop reason: HOSPADM

## 2022-11-06 RX ORDER — TRANEXAMIC ACID 10 MG/ML
1000 INJECTION, SOLUTION INTRAVENOUS ONCE AS NEEDED
Status: DISCONTINUED | OUTPATIENT
Start: 2022-11-06 | End: 2022-11-06

## 2022-11-06 RX ORDER — OXYCODONE HYDROCHLORIDE 5 MG/1
5 TABLET ORAL EVERY 6 HOURS PRN
Status: DISCONTINUED | OUTPATIENT
Start: 2022-11-06 | End: 2022-11-08 | Stop reason: HOSPADM

## 2022-11-06 RX ORDER — IBUPROFEN 600 MG/1
600 TABLET ORAL EVERY 6 HOURS PRN
Status: DISCONTINUED | OUTPATIENT
Start: 2022-11-06 | End: 2022-11-08 | Stop reason: HOSPADM

## 2022-11-06 RX ORDER — METHYLERGONOVINE MALEATE 0.2 MG/ML
200 INJECTION INTRAVENOUS ONCE AS NEEDED
Status: DISCONTINUED | OUTPATIENT
Start: 2022-11-06 | End: 2022-11-06

## 2022-11-06 RX ORDER — CALCIUM CARBONATE 200(500)MG
500 TABLET,CHEWABLE ORAL EVERY 4 HOURS PRN
Status: DISCONTINUED | OUTPATIENT
Start: 2022-11-06 | End: 2022-11-08 | Stop reason: HOSPADM

## 2022-11-06 RX ORDER — MISOPROSTOL 200 UG/1
1000 TABLET ORAL ONCE AS NEEDED
Status: DISCONTINUED | OUTPATIENT
Start: 2022-11-06 | End: 2022-11-06

## 2022-11-06 RX ORDER — FENTANYL/ROPIVACAINE/NS/PF 2-1500 MCG
PREFILLED PUMP RESERVOIR INJECTION CONTINUOUS
Status: DISCONTINUED | OUTPATIENT
Start: 2022-11-06 | End: 2022-11-08 | Stop reason: HOSPADM

## 2022-11-06 RX ORDER — EPHEDRINE SULFATE/0.9% NACL/PF 50 MG/5 ML
10 SYRINGE (ML) INTRAVENOUS ONCE
Status: DISCONTINUED | OUTPATIENT
Start: 2022-11-06 | End: 2022-11-06

## 2022-11-06 RX ORDER — OXYTOCIN/0.9 % SODIUM CHLORIDE 40/1000ML
PLASTIC BAG, INJECTION (ML) INTRAVENOUS CONTINUOUS
Status: DISCONTINUED | OUTPATIENT
Start: 2022-11-06 | End: 2022-11-06

## 2022-11-06 RX ORDER — OXYTOCIN 10 [USP'U]/ML
10 INJECTION, SOLUTION INTRAMUSCULAR; INTRAVENOUS ONCE AS NEEDED
Status: COMPLETED | OUTPATIENT
Start: 2022-11-06 | End: 2022-11-06

## 2022-11-06 RX ORDER — ACETAMINOPHEN 325 MG/1
650 TABLET ORAL EVERY 4 HOURS PRN
Status: DISCONTINUED | OUTPATIENT
Start: 2022-11-06 | End: 2022-11-08 | Stop reason: HOSPADM

## 2022-11-06 RX ORDER — OXYTOCIN/0.9 % SODIUM CHLORIDE 40/1000ML
500 PLASTIC BAG, INJECTION (ML) INTRAVENOUS ONCE
Status: DISCONTINUED | OUTPATIENT
Start: 2022-11-06 | End: 2022-11-06

## 2022-11-06 RX ORDER — SODIUM CHLORIDE, SODIUM LACTATE, POTASSIUM CHLORIDE, CALCIUM CHLORIDE 600; 310; 30; 20 MG/100ML; MG/100ML; MG/100ML; MG/100ML
125 INJECTION, SOLUTION INTRAVENOUS CONTINUOUS
Status: DISCONTINUED | OUTPATIENT
Start: 2022-11-06 | End: 2022-11-08 | Stop reason: HOSPADM

## 2022-11-06 RX ORDER — LIDOCAINE HYDROCHLORIDE 10 MG/ML
0-30 INJECTION, SOLUTION EPIDURAL; INFILTRATION; INTRACAUDAL; PERINEURAL ONCE AS NEEDED
Status: DISCONTINUED | OUTPATIENT
Start: 2022-11-06 | End: 2022-11-06

## 2022-11-06 RX ADMIN — Medication 50 ML: at 05:37

## 2022-11-06 RX ADMIN — SENNOSIDES AND DOCUSATE SODIUM 1 TABLET: 50; 8.6 TABLET ORAL at 19:54

## 2022-11-06 RX ADMIN — Medication 10 ML: at 05:46

## 2022-11-06 RX ADMIN — SENNOSIDES AND DOCUSATE SODIUM 1 TABLET: 50; 8.6 TABLET ORAL at 08:57

## 2022-11-06 RX ADMIN — OXYTOCIN 10 UNITS: 10 INJECTION, SOLUTION INTRAMUSCULAR; INTRAVENOUS at 06:00

## 2022-11-06 RX ADMIN — IBUPROFEN 600 MG: 600 TABLET, FILM COATED ORAL at 08:57

## 2022-11-06 RX ADMIN — PRENATAL VIT W/ FE FUMARATE-FA TAB 27-0.8 MG 1 TABLET: 27-0.8 TAB at 08:57

## 2022-11-06 RX ADMIN — SODIUM CHLORIDE, POTASSIUM CHLORIDE, SODIUM LACTATE AND CALCIUM CHLORIDE 1000 ML: 600; 310; 30; 20 INJECTION, SOLUTION INTRAVENOUS at 05:33

## 2022-11-06 RX ADMIN — LIDOCAINE HYDROCHLORIDE,EPINEPHRINE BITARTRATE 3 ML: 15; .005 INJECTION, SOLUTION EPIDURAL; INFILTRATION; INTRACAUDAL; PERINEURAL at 05:42

## 2022-11-06 NOTE — L&D DELIVERY NOTE
OB Vaginal Delivery Note    Delivery:2022 at 5:53 AM   Patient:Shreya De Jesus  :1990    Review the Delivery Report for details.     Delivery Details    Pre-Op Diagnosis: 1. 31 y.o.  intrauterine pregnancy at 40w6d with chen gestation.     Post-Op Diagnosis: 1. Same as pre op   Delivery Clinician: Jessy Ruiz    Delivery Assist;Delivery Nurse;Nursery Nurse;Nursery Nurse;Physician Assistant  ;Jaz Nichole;July Servin;Leyla Giron;Sarita Solomon    Delivery Type: Vaginal, Spontaneous    Labor Complications: Precipitous Labor    EBL: 300  mL   Anesthesia Type: Epidural    Placenta Delivery Spontaneous    Placenta Disposition: discarded    Additional Specimens None      INFANT INFORMATION  Time of Birth:5:53 AM   Presentation: Vertex   Position: ,Occiput ,Anterior   Cord: 3 vessels ,Complications:None   [place] Sex: female    Weight: 4.095 kg (9 lb 0.5 oz)      1 Minute 5 Minute 10 Minute   Apgar Totals: 8   8          Information for the patient's :  Rebecca De Jesus [[id number]]     [place] Cord Gas     None           Delivery Details:    Shreya De Jesus is a 31 y.o.  at 40w6d gestation who presented to the hospital for Term pregnancy [Z34.90].  Her labor was spontaneous.  The patient progressed to fully dilated and pushed for  hours and   minutes.  A viable  female  infant was delivered by Vaginal, Spontaneous  from  ,Occiput ,Anterior .  The anterior and posterior shoulders delivered without difficulty followed by the remainder of the infant. A spontaneous cry was heard, and the infant appeared to be moving all 4 extremities. The cord was clamped and cut with 3 vessels  noted.  The placenta delivered spontaneously shortly thereafter.  Fundal massage was performed and the fundus was found to be firm, and lochia was within normal limits. The perineum, vagina, cervix were inspected, and the following lacerations were noted:      Episiotomy: None     Lacerations:   Perineal: 1st  Repaired:      Periurethral:    Repaired:     Labial:   Repaired:     Sulcus:   Repaired:     Vaginal:   Repaired:     Cervical:    Repaired:        Any lacerations were repaired in the usual fashion using 2-0 Synthetic Suture  suture. Excellent hemostasis was noted. At the completion of the case, sponge and needle counts were correct. The infant and patient were left in the delivery room in stable condition.     Attending Attestation: I was present and scrubbed for the entire procedure.    Jessy Ruiz MD

## 2022-11-06 NOTE — ANESTHESIOLOGIST PRE-PROCEDURE ATTESTATION
Pre-Procedure Patient Identification:  I am the Primary Anesthesiologist and have identified the patient on 11/06/22 at 5:33 AM.   I have confirmed the procedure(s) will be performed by the following surgeon/proceduralist * Surgery not found *.

## 2022-11-06 NOTE — H&P
MARIELLA Barbosa is a 31 y.o. female  at 40w6d with an estimated due date of 10/31/2022, by Last Menstrual Period who presents with strong contractions. Normal FM.  Meconium stained fluid.    Uncomplicated PNC      OB History:   OB History    Para Term  AB Living   4 3 3 0 0 3   SAB IAB Ectopic Multiple Live Births   0 0 0 0 3      # Outcome Date GA Lbr Ventura/2nd Weight Sex Delivery Anes PTL Lv   4 Current            3 Term 10/22/20 39w4d 06:55 / 00:48 4.273 kg (9 lb 6.7 oz) M Vag-Spont EPI N CHYNA      Complications: Other (comment)      Name: STEPHANIE BARBOSA      Apgar1: 8  Apgar5: 9   2 Term 12/10/16    M  None N CHYNA   1 Term 14    F Vag-Spont EPI N CHYNA       Medical History:   Past Medical History:   Diagnosis Date    Precipitous delivery        Surgical History:   Past Surgical History:   Procedure Laterality Date    CYST REMOVAL      on heart    WISDOM TOOTH EXTRACTION         Social History:   Social History     Socioeconomic History    Marital status: Single     Spouse name: None    Number of children: None    Years of education: None    Highest education level: None   Tobacco Use    Smoking status: Never    Smokeless tobacco: Never   Substance and Sexual Activity    Alcohol use: Not Currently    Drug use: Never    Sexual activity: Yes        Family History:   Family History   Problem Relation Age of Onset    Diabetes Maternal Grandmother        Allergies: Patient has no known allergies.    Prior to Admission medications    Medication Sig Start Date End Date Taking? Authorizing Provider   clindamycin (CLINDAGEL) 1 % gel  20   Ginna Bella MD   prenatal 21-iron fu-folic acid 14 mg iron- 400 mcg tablet Take 1 tablet by mouth daily. 13   Ginna Bella MD   sennosides-docusate sodium (SENOKOT-S) 8.6-50 mg Take 1 tablet by mouth 2 (two) times a day for 179 doses. 10/23/20 1/21/21  Zabrina Chacon MD   ferrous sulfate 325 mg (65 mg iron) tablet  Take 1 tablet (325 mg total) by mouth 2 (two) times a day with meals. 10/23/20 11/6/22  Zabrina Chacon MD       Review of Systems  Pertinent items are noted in HPI.    Objective     Vital Signs for the last 24 hours:  Temp:  [36.6 °C (97.8 °F)] 36.6 °C (97.8 °F)  Heart Rate:  [76] 76  Resp:  [16] 16  BP: (131)/(79) 131/79    Latest cervical exam:  Cervical Dilation (cm): 10  Cervical Effacement: 100  Fetal Station: -1  Method: sterile exam per RN (22 [id number])    EFM baseline 130 mod nena +accel no decel  [place]: q2    Labs:  ABO   Date Value Ref Range Status   2022 O  Final     Rh Factor   Date Value Ref Range Status   2022 Positive  Final     Rubella IgG Scr   Date Value Ref Range Status   2020 immune  Final     Strep Gp B Cult/DNA Probe   Date Value Ref Range Status   10/05/2022 No Group B Streptococcus Isolated See table below, No growth at 18-24 hours, Probable contaminants, suggest recollection, No growth at 48 hours, No growth at 72 hours, No growth at 96 hours, No growth at 120 hours, No growth at 1 week, No growth at 2 weeks, No growth at 3 weeks, No gr... Final       Assessment/Plan     Shreya De Jesus is a 31 y.o. female  at 40w6d admitted for labor management     FHR: Category I  GBS: negative     Epidural PRN  Expectant mng    Jessy Ruiz MD

## 2022-11-06 NOTE — ANESTHESIA PROCEDURE NOTES
Epidural Block    Patient location during procedure: OB  Start time: 11/6/2022 5:36 AM  End time: 11/6/2022 5:54 AM  Reason for block: labor analgesia requested by patient and obstetrician  Staffing  Performed: anesthesiologist   Anesthesiologist: Javan Keith MD  Preanesthetic Checklist  Completed: patient identified, surgical consent, pre-op evaluation, timeout performed, IV checked, risks and benefits discussed, monitors and equipment checked and sterile field maintained during procedure  Needle  Needle gauge: 17 G  Needle length: 3.5 in  Catheter type: Single orifice  Catheter size: 19 G  Test dose: negative and lidocaine 1.5% with epinephrine 1-to-200,000  Additional Notes  Procedure well tolerated. Vital signs stable. No paresthesia.  Analgesia satisfactory. Patients nurse to notify anesthesiologist if hemodynamically unstable.    Medications Administered -   lidocaine 1.5%-EPINEPHrine 1:200,000 PF (XYLOCAINE W/EPI) injection - epidural   3 mL - 11/6/2022 5:42:00 AM

## 2022-11-06 NOTE — ANESTHESIA POSTPROCEDURE EVALUATION
Patient: Shreya De Jesus    Procedure Summary     Date: 11/06/22 Room / Location:     Anesthesia Start: 0536 Anesthesia Stop: 0553    Procedure: Labor Analgesia Diagnosis:     Scheduled Providers:  Responsible Provider: Javan Keith MD    Anesthesia Type: labor epidural ASA Status: 2          Anesthesia Type: labor epidural  PACU Vitals    No data found in the last 10 encounters.           Anesthesia Post Evaluation    Pain management: adequate  Mode of pain management: IV medication and epidural  Patient location during evaluation: floor  Patient participation: complete - patient participated  Level of consciousness: awake and alert  Cardiovascular status: acceptable  Airway Patency: adequate  Respiratory status: acceptable  Hydration status: acceptable  Pain well controlled after spinal preservative free morphine administration: Yes  Continue 24 hours observation after preservative free morphine administration: Yes  Anesthetic complications: no

## 2022-11-06 NOTE — NURSING NOTE
Pt with trickle of bleeding during assessment. Fundus firm at umbilicus, no clots present. Dr Ruiz notified, ok to continue to assess for now.

## 2022-11-07 PROCEDURE — 63700000 HC SELF-ADMINISTRABLE DRUG: Performed by: STUDENT IN AN ORGANIZED HEALTH CARE EDUCATION/TRAINING PROGRAM

## 2022-11-07 PROCEDURE — 12000000 HC ROOM AND CARE MED/SURG

## 2022-11-07 RX ORDER — ACETAMINOPHEN 325 MG/1
650 TABLET ORAL EVERY 4 HOURS PRN
Start: 2022-11-07 | End: 2022-12-07

## 2022-11-07 RX ORDER — IBUPROFEN 600 MG/1
600 TABLET ORAL EVERY 6 HOURS PRN
Qty: 40 TABLET | Refills: 1 | Status: SHIPPED | OUTPATIENT
Start: 2022-11-07 | End: 2022-11-17

## 2022-11-07 RX ADMIN — PRENATAL VIT W/ FE FUMARATE-FA TAB 27-0.8 MG 1 TABLET: 27-0.8 TAB at 09:40

## 2022-11-07 RX ADMIN — SENNOSIDES AND DOCUSATE SODIUM 1 TABLET: 50; 8.6 TABLET ORAL at 20:21

## 2022-11-07 RX ADMIN — SENNOSIDES AND DOCUSATE SODIUM 1 TABLET: 50; 8.6 TABLET ORAL at 09:40

## 2022-11-07 NOTE — DISCHARGE INSTRUCTIONS
POSTPARTUM DISCHARGE INSTRUCTIONS      If you had a vaginal delivery: Call for postpartum  appointment with any of our physicians in 6wks.   If you had a  Section: call for a 6 weeks postpartum visit.     Activities/Restrictions:    -No driving for 7-14 days.  No driving if taking Norco for pain.   -No baths or swimming for 6 weeks.  Showers only.   -No tampons, douching, intercourse for 6 weeks   -No heavy lifting more than the baby for 6 weeks.    -No heavy exercise for 6 weeks.      Medications:   -Please resume prenatal vitamins if you are breast feeding  -Colace 100mg take one tablet by mouth daily as needed for constipation. This is an over-the-counter medication.   -For hemorrhoids, use Tucks pads, Preparation H, Proctofoam as needed.  -For cracked/sore nipples you may use Lanolin cream.    Please resume your general diet.     To help with and episiotomy or vaginal laceration disomfort, you may:  1. Apply cold packs or chilled witch-marie pads to the area  2. Take sitz baths (soaking in a few inches of warm water will help)  3. Use over the counter Dermaplast spray  4. Apply warm water to the area after urination using a squeeze water bottle  5. Always wipe from front to back to prevent infection    If you had a  section:  1. Keep you incision clean and dry.  2. You may let the water run over your incision in the shower but do not directly scrub the incision.  3. Do not apply creams or lotions to the incision  4. Your steri-strips may fall off on their own, otherwise remove them in the shower after 7 days    Please call the office if you have:  1. Heavy vaginal bleeding (soaking more than one pad per hour or passing clots larger than an egg)  2. Increasing redness or swelling at or near your incision or episiotomy site  3. Inability to tolerate food or drink without vomiting  4. Opening, bleeding, discharge or separation of your incision or episiotomy site  5. Foul smelling discharge  6.  "Chills or fever over 100.4 degrees Fahrenheit  7. Increasing pain (not relieved by pain medication)  8. Headache unrelieved by \"pain meds\"  9. New calf pain especially if only on one side  10. Unrelieved feelings of:  Inability to cope  Sadness  Anxiety  Lack of interest in baby  Insomnia  Crying    "

## 2022-11-07 NOTE — DISCHARGE SUMMARY
Inpatient Discharge Summary    BRIEF OVERVIEW  Admitting Provider: Jessy Ruiz MD  Discharge Provider: Venus Lewis,*  Primary Care Physician at Discharge: Sunita Man [phone]    Admission Date: 11/6/2022     Discharge Date: 11/8/2022      Date of Delivery: 11/6/2022 at 5:53 AM     Gestational Age:40w6d    Delivered By: Jessy Ruiz     Delivery Type: spontaneous vaginal delivery    Antepartum complications: none    Baby: Liveborn female , Apgars 8  /8  , 4.095 kg (9 lb 0.5 oz)     Anesthesia: Epidural     Intrapartum complications: None    Laceration: 1st     Rh Immune globulin given: not applicable    Discharge Date: 11/8/2022      Plan:    Follow-up appointment with your doctors in 6 weeks, please call for an appointment    Melissa Flowers MD      Discharge Disposition  Home     Discharge Medications     Medication List      START taking these medications    acetaminophen 325 mg tablet  Commonly known as: TYLENOL  Take 2 tablets (650 mg total) by mouth every 4 (four) hours as needed for pain.  Dose: 650 mg     ibuprofen 600 mg tablet  Commonly known as: MOTRIN  Take 1 tablet (600 mg total) by mouth every 6 (six) hours as needed for pain for up to 10 days.  Dose: 600 mg        CONTINUE taking these medications    prenatal 21-iron fu-folic acid 14 mg iron- 400 mcg tablet  Take 1 tablet by mouth daily.  Dose: 1 tablet     sennosides-docusate sodium 8.6-50 mg  Commonly known as: SENOKOT-S  Take 1 tablet by mouth 2 (two) times a day for 179 doses.  Dose: 1 tablet        STOP taking these medications    clindamycin 1 % gel  Commonly known as: CLINDAGEL

## 2022-11-07 NOTE — PROGRESS NOTES
"POST PARTUM NOTE    PPD#1    S:  Patient seen and examined.  The patient has no complaints at this time.  Pain is well controlled with Motrin.  Tolerated general diet.  Denies n/v/d/f/c. Lochia decreasing. Infant is currently in NICU.    O:    Visit Vitals  /69 (BP Location: Right upper arm, Patient Position: Lying)   Pulse 75   Temp 36.6 °C (97.8 °F) (Oral)   Resp 18   Ht 1.702 m (5' 7\")   Wt 75.8 kg (167 lb)   LMP 2022   SpO2 98%   Breastfeeding No   BMI 26.16 kg/m²     Physical Exam:  AAOx3, NAD  Abd: soft, appropriately tender to palpation  Fundus: firm below umbilicus and nontender  Ex: non-tender, no cyanosis    A/P: 31 y.o.  s/p   PPD#1    DISPO:Continue general postpartum care   Discharge home tomorrow with scripts and instructions    Melissa Flowers MD        "

## 2022-11-08 VITALS
HEIGHT: 67 IN | SYSTOLIC BLOOD PRESSURE: 128 MMHG | RESPIRATION RATE: 16 BRPM | OXYGEN SATURATION: 97 % | DIASTOLIC BLOOD PRESSURE: 83 MMHG | WEIGHT: 167 LBS | TEMPERATURE: 98.8 F | HEART RATE: 83 BPM | BODY MASS INDEX: 26.21 KG/M2

## 2022-11-08 PROCEDURE — 63700000 HC SELF-ADMINISTRABLE DRUG: Performed by: STUDENT IN AN ORGANIZED HEALTH CARE EDUCATION/TRAINING PROGRAM

## 2022-11-08 RX ADMIN — PRENATAL VIT W/ FE FUMARATE-FA TAB 27-0.8 MG 1 TABLET: 27-0.8 TAB at 08:11

## 2022-11-08 RX ADMIN — SENNOSIDES AND DOCUSATE SODIUM 1 TABLET: 50; 8.6 TABLET ORAL at 08:11

## 2022-11-08 NOTE — PLAN OF CARE
Problem: Adjustment to Role Transition (Postpartum Vaginal Delivery)  Goal: Successful Maternal Role Transition  11/8/2022 0531 by Estrella Coy RN  Outcome: Progressing  11/8/2022 0530 by Estrella Coy RN  Outcome: Progressing     Problem: Bleeding (Postpartum Vaginal Delivery)  Goal: Hemostasis  11/8/2022 0531 by Estrella Coy RN  Outcome: Progressing  11/8/2022 0530 by Estrella Coy RN  Outcome: Progressing     Problem: Infection (Postpartum Vaginal Delivery)  Goal: Absence of Infection Signs and Symptoms  11/8/2022 0531 by Estrella Coy RN  Outcome: Progressing  11/8/2022 0530 by Estrella Coy RN  Outcome: Progressing     Problem: Pain (Postpartum Vaginal Delivery)  Goal: Acceptable Pain Control  11/8/2022 0531 by Estrella Coy RN  Outcome: Progressing  11/8/2022 0530 by Estrella Coy RN  Outcome: Progressing     Problem: Urinary Retention (Postpartum Vaginal Delivery)  Goal: Effective Urinary Elimination  11/8/2022 0531 by Estrella Coy RN  Outcome: Progressing  11/8/2022 0530 by Estrella Coy RN  Outcome: Progressing     Problem: Breastfeeding  Goal: Effective Breastfeeding  Outcome: Unable to meet, plan of care revised  Baby in NICU; mother unable to breastfeed at this time.

## 2022-11-08 NOTE — PROGRESS NOTES
Obstetrics Postpartum Progress Note    Events  No acute events overnight.    Subjective  Pain: no  Bleeding: lochia minimal  Diet: taking regular diet  Voiding: without difficulty  Ambulating: as tolerated    Vitals  Temp:  [36.6 °C (97.8 °F)-37.1 °C (98.8 °F)] 37.1 °C (98.8 °F)  Heart Rate:  [64-83] 83  Resp:  [16-18] 16  BP: (114-128)/(65-83) 128/83    I&O  No intake or output data in the 24 hours ending 22 1101    Physical Exam  General: well  Heart:regular  Lungs: without issue  Abdomen: soft, nondistended, non-tender  Fundus: firm, below umbilicus and nontender  Perineum: deferred  Extremities: symmetric    Labs  Labs Reviewed:  Lab Results   Component Value Date    ABO O 2022    LABRH Positive 2022        Assessment/Plan   Problem-based Assessment and Plan    Shreya De Jesus is a 31 y.o.  postpartum day 2 s/p Vaginal, Spontaneous .-waiting on baby status- may have GERD- otherwise recovering well-rev dc instructions and follow up    1. Vital Signs: stable  2. Hemodynamics: stable  3. Pain: controlled  4. VTE Assessment: Early Ambulation  5. Vaccinations/Rhogam: rhogam not indicated   6. Postpartum care: meeting all goals     Venus Lewis DO

## 2023-07-02 ENCOUNTER — HOSPITAL ENCOUNTER (EMERGENCY)
Facility: HOSPITAL | Age: 33
Discharge: HOME | End: 2023-07-02
Attending: EMERGENCY MEDICINE | Admitting: EMERGENCY MEDICINE
Payer: COMMERCIAL

## 2023-07-02 ENCOUNTER — APPOINTMENT (EMERGENCY)
Dept: RADIOLOGY | Facility: HOSPITAL | Age: 33
End: 2023-07-02
Payer: COMMERCIAL

## 2023-07-02 VITALS
SYSTOLIC BLOOD PRESSURE: 114 MMHG | RESPIRATION RATE: 18 BRPM | TEMPERATURE: 97.1 F | HEART RATE: 63 BPM | WEIGHT: 153.66 LBS | BODY MASS INDEX: 24.12 KG/M2 | DIASTOLIC BLOOD PRESSURE: 55 MMHG | HEIGHT: 67 IN | OXYGEN SATURATION: 99 %

## 2023-07-02 DIAGNOSIS — S90.31XA CONTUSION OF RIGHT FOOT, INITIAL ENCOUNTER: Primary | ICD-10-CM

## 2023-07-02 PROCEDURE — 99283 EMERGENCY DEPT VISIT LOW MDM: CPT

## 2023-07-02 PROCEDURE — 73630 X-RAY EXAM OF FOOT: CPT

## 2023-07-02 NOTE — ED PROVIDER NOTES
History  Chief Complaint   Patient presents with   • Foot Injury     Patient injured right foot      Patient kicked a refrigerator yesterday. Complains of continued pain in the right foot. History of foot fracture as a child. History provided by:  Patient   used: No    Medical Problem  Location:  Right foot pain  Quality:  Achy  Severity:  Mild  Onset quality:  Sudden  Duration:  1 day  Timing:  Constant  Progression:  Unchanged  Chronicity:  New  Context:  Kicked a refrigerator yesterday. Complains of pain. Relieved by:  Nothing  Worsened by:  Weightbearing and movement  Ineffective treatments:  None tried  Associated symptoms: myalgias    Associated symptoms: no abdominal pain, no chest pain, no congestion, no cough, no diarrhea, no ear pain, no fever, no headaches, no nausea, no rash, no shortness of breath, no sore throat, no vomiting and no wheezing        None       History reviewed. No pertinent past medical history. History reviewed. No pertinent surgical history. History reviewed. No pertinent family history. I have reviewed and agree with the history as documented. E-Cigarette/Vaping   • E-Cigarette Use Never User      E-Cigarette/Vaping Substances     Social History     Tobacco Use   • Smoking status: Never   • Smokeless tobacco: Never   Vaping Use   • Vaping Use: Never used   Substance Use Topics   • Alcohol use: Not Currently   • Drug use: Not Currently       Review of Systems   Constitutional: Negative for chills and fever. HENT: Negative for congestion, ear pain, hearing loss, sore throat, trouble swallowing and voice change. Eyes: Negative for pain and discharge. Respiratory: Negative for cough, shortness of breath and wheezing. Cardiovascular: Negative for chest pain and palpitations. Gastrointestinal: Negative for abdominal pain, blood in stool, constipation, diarrhea, nausea and vomiting.    Genitourinary: Negative for dysuria, flank pain, frequency and hematuria. Musculoskeletal: Positive for arthralgias and myalgias. Negative for joint swelling, neck pain and neck stiffness. Skin: Negative for rash and wound. Neurological: Negative for dizziness, seizures, syncope, facial asymmetry and headaches. Psychiatric/Behavioral: Negative for hallucinations, self-injury and suicidal ideas. All other systems reviewed and are negative. Physical Exam  Physical Exam  Constitutional:       General: She is not in acute distress. Appearance: Normal appearance. She is not ill-appearing. HENT:      Head: Normocephalic and atraumatic. Right Ear: External ear normal.      Left Ear: External ear normal.      Nose: Nose normal.      Mouth/Throat:      Mouth: Mucous membranes are moist.   Eyes:      Extraocular Movements: Extraocular movements intact. Pupils: Pupils are equal, round, and reactive to light. Cardiovascular:      Rate and Rhythm: Normal rate and regular rhythm. Pulmonary:      Effort: Pulmonary effort is normal. No respiratory distress. Breath sounds: Normal breath sounds. Abdominal:      General: Abdomen is flat. Bowel sounds are normal. There is no distension. Palpations: Abdomen is soft. Tenderness: There is no abdominal tenderness. Musculoskeletal:         General: Tenderness present. No swelling. Cervical back: Normal range of motion and neck supple. Comments: Right ankle is nontender to palpation. Right foot is tender along the lateral aspect of the foot. There is bruising noted at the distal end of the second through fifth metatarsals. No obvious bony deformity noted. Dorsalis pedis pulses 2+. Skin:     General: Skin is warm and dry. Capillary Refill: Capillary refill takes less than 2 seconds. Neurological:      General: No focal deficit present. Mental Status: She is alert and oriented to person, place, and time.    Psychiatric:         Mood and Affect: Mood normal. Behavior: Behavior normal.         Vital Signs  ED Triage Vitals [07/02/23 0931]   Temperature Pulse Respirations Blood Pressure SpO2   (!) 97.1 °F (36.2 °C) 63 18 114/55 99 %      Temp Source Heart Rate Source Patient Position - Orthostatic VS BP Location FiO2 (%)   Temporal Monitor Sitting Left arm --      Pain Score       5           Vitals:    07/02/23 0931   BP: 114/55   Pulse: 63   Patient Position - Orthostatic VS: Sitting         Visual Acuity      ED Medications  Medications - No data to display    Diagnostic Studies  Results Reviewed     None                 XR foot 3+ views RIGHT   ED Interpretation by Aaron Dugan MD (07/02 5431)   No fracture                 Procedures  Procedures         ED Course                                             Medical Decision Making  Amount and/or Complexity of Data Reviewed  Radiology: ordered and independent interpretation performed. Decision-making details documented in ED Course. Discussion of management or test interpretation with external provider(s): Foot fractures versus foot contusion versus toe fracture versus toe contusion. Disposition  Final diagnoses:   Contusion of right foot, initial encounter     Time reflects when diagnosis was documented in both MDM as applicable and the Disposition within this note     Time User Action Codes Description Comment    7/2/2023  9:48 AM Shakila Reynaga Add [S90.31XA] Contusion of right foot, initial encounter       ED Disposition     ED Disposition   Discharge    Condition   Stable    Date/Time   Sun Jul 2, 2023  9:48 AM    Comment   Rekha Laws discharge to home/self care.                Follow-up Information     Follow up With Specialties Details Why Contact Info    Nas Melgar MD  Call in 3 days  [address] [employer] [facility] Call in 1 week As needed Valentin [facility]  [facility]  [phone] Patient's Medications    No medications on file       No discharge procedures on file.     PDMP Review     None          ED Provider  Electronically Signed by           Paul Jacob MD  07/02/23 7626

## 2023-07-02 NOTE — Clinical Note
Eliza Najera was seen and treated in our emergency department on 7/2/2023. Diagnosis: Foot injury    Julita Castro  may return to work on return date. She may return on this date: 07/05/2023         If you have any questions or concerns, please don't hesitate to call.       Abdelrahman Mike RN    ______________________________           _______________          _______________  Hospital Representative                              Date                                Time

## 2023-07-02 NOTE — DISCHARGE INSTRUCTIONS
Ice and elevation to the foot. Please take Tylenol and/or Advil/Motrin/ibuprofen as needed for pain and swelling.

## 2024-08-23 ENCOUNTER — TRANSCRIBE ORDERS (OUTPATIENT)
Dept: SCHEDULING | Age: 34
End: 2024-08-23

## 2024-08-23 DIAGNOSIS — Z32.01 ENCOUNTER FOR PREGNANCY TEST, RESULT POSITIVE: Primary | ICD-10-CM

## 2024-08-23 DIAGNOSIS — Z87.59 PERSONAL HISTORY OF OTHER COMPLICATIONS OF PREGNANCY, CHILDBIRTH AND THE PUERPERIUM: ICD-10-CM

## 2024-08-27 ENCOUNTER — TRANSCRIBE ORDERS (OUTPATIENT)
Dept: SCHEDULING | Age: 34
End: 2024-08-27

## 2024-08-27 DIAGNOSIS — Z87.59 PERSONAL HISTORY OF OTHER COMPLICATIONS OF PREGNANCY, CHILDBIRTH AND THE PUERPERIUM: ICD-10-CM

## 2024-08-27 DIAGNOSIS — Z32.01 ENCOUNTER FOR PREGNANCY TEST, RESULT POSITIVE: Primary | ICD-10-CM

## 2024-09-19 ENCOUNTER — HOSPITAL ENCOUNTER (OUTPATIENT)
Dept: RADIOLOGY | Age: 34
Discharge: HOME | End: 2024-09-19
Attending: OBSTETRICS & GYNECOLOGY
Payer: COMMERCIAL

## 2024-09-19 DIAGNOSIS — Z32.01 ENCOUNTER FOR PREGNANCY TEST, RESULT POSITIVE: ICD-10-CM

## 2024-09-19 DIAGNOSIS — Z87.59 PERSONAL HISTORY OF OTHER COMPLICATIONS OF PREGNANCY, CHILDBIRTH AND THE PUERPERIUM: ICD-10-CM

## 2024-09-19 LAB
HBV SURFACE AG SER QL: NONREACTIVE
HIV 1+2 AB+HIV1 P24 AG SERPL QL IA: NONREACTIVE
QST CHLAMYDIA TRACHOMATIS RNA, TMA: NORMAL
QST NEISSERIA GONORRHOEAE RNA, TMA: NORMAL

## 2024-09-19 PROCEDURE — 76817 TRANSVAGINAL US OBSTETRIC: CPT

## 2024-09-19 PROCEDURE — 76801 OB US < 14 WKS SINGLE FETUS: CPT

## 2024-09-27 ENCOUNTER — TRANSCRIBE ORDERS (OUTPATIENT)
Dept: SCHEDULING | Age: 34
End: 2024-09-27

## 2024-09-27 DIAGNOSIS — Z36.3 ENCOUNTER FOR ANTENATAL SCREENING FOR MALFORMATIONS: ICD-10-CM

## 2024-09-27 DIAGNOSIS — Z36.82 ENCOUNTER FOR ANTENATAL SCREENING FOR NUCHAL TRANSLUCENCY: Primary | ICD-10-CM

## 2024-10-10 ENCOUNTER — HOSPITAL ENCOUNTER (OUTPATIENT)
Dept: PERINATAL CARE | Facility: HOSPITAL | Age: 34
Discharge: HOME | End: 2024-10-10
Attending: NURSE PRACTITIONER
Payer: COMMERCIAL

## 2024-10-10 DIAGNOSIS — Z3A.13 13 WEEKS GESTATION OF PREGNANCY: ICD-10-CM

## 2024-10-10 DIAGNOSIS — O36.80X0 ENCOUNTER TO DETERMINE FETAL VIABILITY OF PREGNANCY, SINGLE OR UNSPECIFIED FETUS: Primary | Chronic | ICD-10-CM

## 2024-10-10 DIAGNOSIS — Z36.3 ENCOUNTER FOR ANTENATAL SCREENING FOR MALFORMATIONS: ICD-10-CM

## 2024-10-10 DIAGNOSIS — Z36.82 ENCOUNTER FOR ANTENATAL SCREENING FOR NUCHAL TRANSLUCENCY: ICD-10-CM

## 2024-10-10 PROCEDURE — 76813 OB US NUCHAL MEAS 1 GEST: CPT

## 2024-12-02 ENCOUNTER — HOSPITAL ENCOUNTER (OUTPATIENT)
Dept: PERINATAL CARE | Facility: HOSPITAL | Age: 34
Discharge: HOME | End: 2024-12-02
Attending: NURSE PRACTITIONER
Payer: COMMERCIAL

## 2024-12-02 ENCOUNTER — TELEPHONE (OUTPATIENT)
Dept: PERINATAL CARE | Facility: HOSPITAL | Age: 34
End: 2024-12-02
Payer: COMMERCIAL

## 2024-12-02 DIAGNOSIS — Z36.3 ENCOUNTER FOR ROUTINE SCREENING FOR MALFORMATION USING ULTRASONICS: ICD-10-CM

## 2024-12-02 DIAGNOSIS — Z3A.20 20 WEEKS GESTATION OF PREGNANCY: ICD-10-CM

## 2024-12-02 PROCEDURE — 76805 OB US >/= 14 WKS SNGL FETUS: CPT

## 2024-12-02 NOTE — LETTER
December 2, 2024     Patient: Shreya De Jesus  YOB: 1990  Date of Visit: 12/2/2024    To Whom it May Concern:    Shreya De Jesus was seen in my clinic on 12/2/2024. Please excuse Shreya for her absence from work on this day to make the appointment.    If you have any questions or concerns, please don't hesitate to call.         Sincerely,         Christina Shepherd MD        CC: No Recipients

## 2025-02-06 ENCOUNTER — TRANSCRIBE ORDERS (OUTPATIENT)
Dept: SCHEDULING | Age: 35
End: 2025-02-06

## 2025-02-06 DIAGNOSIS — O09.893 SUPERVISION OF OTHER HIGH RISK PREGNANCIES, THIRD TRIMESTER: Primary | ICD-10-CM

## 2025-03-07 ENCOUNTER — HOSPITAL ENCOUNTER (OUTPATIENT)
Dept: PERINATAL CARE | Facility: HOSPITAL | Age: 35
Discharge: HOME | End: 2025-03-07
Attending: OBSTETRICS & GYNECOLOGY
Payer: COMMERCIAL

## 2025-03-07 DIAGNOSIS — O09.893 SUPERVISION OF OTHER HIGH RISK PREGNANCIES, THIRD TRIMESTER: Primary | ICD-10-CM

## 2025-03-07 DIAGNOSIS — Z3A.34 34 WEEKS GESTATION OF PREGNANCY: ICD-10-CM

## 2025-03-07 DIAGNOSIS — O09.293 HISTORY OF MACROSOMIA IN INFANT IN PRIOR PREGNANCY, CURRENTLY PREGNANT IN THIRD TRIMESTER: ICD-10-CM

## 2025-03-07 PROCEDURE — 76816 OB US FOLLOW-UP PER FETUS: CPT

## 2025-03-21 LAB — GP B STREP SPEC QL CULT: NORMAL

## 2025-03-26 ENCOUNTER — TRANSCRIBE ORDERS (OUTPATIENT)
Dept: SCHEDULING | Age: 35
End: 2025-03-26

## 2025-03-26 DIAGNOSIS — Z34.90 PREGNANCY, UNSPECIFIED GESTATIONAL AGE: Primary | ICD-10-CM

## 2025-03-31 ENCOUNTER — HOSPITAL ENCOUNTER (OUTPATIENT)
Dept: PERINATAL CARE | Facility: HOSPITAL | Age: 35
Discharge: HOME | End: 2025-03-31
Attending: EMERGENCY MEDICAL TECHNICIAN, BASIC
Payer: COMMERCIAL

## 2025-03-31 DIAGNOSIS — Z34.90 PREGNANCY, UNSPECIFIED GESTATIONAL AGE: ICD-10-CM

## 2025-03-31 PROCEDURE — 76816 OB US FOLLOW-UP PER FETUS: CPT | Performed by: OBSTETRICS & GYNECOLOGY

## 2025-04-18 ENCOUNTER — HOSPITAL ENCOUNTER (INPATIENT)
Facility: HOSPITAL | Age: 35
LOS: 1 days | Discharge: HOME | End: 2025-04-19
Attending: STUDENT IN AN ORGANIZED HEALTH CARE EDUCATION/TRAINING PROGRAM | Admitting: STUDENT IN AN ORGANIZED HEALTH CARE EDUCATION/TRAINING PROGRAM
Payer: COMMERCIAL

## 2025-04-18 PROBLEM — Z3A.40 40 WEEKS GESTATION OF PREGNANCY: Status: ACTIVE | Noted: 2025-04-18

## 2025-04-18 LAB
ABO + RH BLD: NORMAL
BLD GP AB SCN SERPL QL: NEGATIVE
D AG BLD QL: POSITIVE
ERYTHROCYTE [DISTWIDTH] IN BLOOD BY AUTOMATED COUNT: 14.8 % (ref 11.7–14.4)
HBV SURFACE AG SER QL: NONREACTIVE
HCT VFR BLD AUTO: 33.7 % (ref 35–45)
HGB BLD-MCNC: 10.6 G/DL (ref 11.8–15.7)
LABORATORY COMMENT REPORT: NORMAL
MCH RBC QN AUTO: 24 PG (ref 28–33.2)
MCHC RBC AUTO-ENTMCNC: 31.5 G/DL (ref 32.2–35.5)
MCV RBC AUTO: 76.2 FL (ref 83–98)
PLATELET # BLD AUTO: 264 K/UL (ref 150–369)
PMV BLD AUTO: 10.7 FL (ref 9.4–12.3)
RBC # BLD AUTO: 4.42 M/UL (ref 3.93–5.22)
SPECIMEN EXP DATE BLD: NORMAL
T PALLIDUM AB SER QL IF: NONREACTIVE
WBC # BLD AUTO: 10.93 K/UL (ref 3.8–10.5)

## 2025-04-18 PROCEDURE — 86850 RBC ANTIBODY SCREEN: CPT

## 2025-04-18 PROCEDURE — 85027 COMPLETE CBC AUTOMATED: CPT | Performed by: STUDENT IN AN ORGANIZED HEALTH CARE EDUCATION/TRAINING PROGRAM

## 2025-04-18 PROCEDURE — 36415 COLL VENOUS BLD VENIPUNCTURE: CPT | Performed by: STUDENT IN AN ORGANIZED HEALTH CARE EDUCATION/TRAINING PROGRAM

## 2025-04-18 PROCEDURE — 63700000 HC SELF-ADMINISTRABLE DRUG: Performed by: STUDENT IN AN ORGANIZED HEALTH CARE EDUCATION/TRAINING PROGRAM

## 2025-04-18 PROCEDURE — 86780 TREPONEMA PALLIDUM: CPT | Performed by: STUDENT IN AN ORGANIZED HEALTH CARE EDUCATION/TRAINING PROGRAM

## 2025-04-18 PROCEDURE — 12000000 HC ROOM AND CARE MED/SURG

## 2025-04-18 PROCEDURE — 63600000 HC DRUGS/DETAIL CODE: Mod: JZ | Performed by: STUDENT IN AN ORGANIZED HEALTH CARE EDUCATION/TRAINING PROGRAM

## 2025-04-18 PROCEDURE — 87340 HEPATITIS B SURFACE AG IA: CPT | Performed by: STUDENT IN AN ORGANIZED HEALTH CARE EDUCATION/TRAINING PROGRAM

## 2025-04-18 PROCEDURE — 72000011 HC VAGINAL DELIVERY LEVEL 1

## 2025-04-18 PROCEDURE — 10907ZC DRAINAGE OF AMNIOTIC FLUID, THERAPEUTIC FROM PRODUCTS OF CONCEPTION, VIA NATURAL OR ARTIFICIAL OPENING: ICD-10-PCS | Performed by: STUDENT IN AN ORGANIZED HEALTH CARE EDUCATION/TRAINING PROGRAM

## 2025-04-18 PROCEDURE — 25000000 HC PHARMACY GENERAL

## 2025-04-18 RX ORDER — IBUPROFEN 600 MG/1
600 TABLET ORAL ONCE
Status: COMPLETED | OUTPATIENT
Start: 2025-04-18 | End: 2025-04-18

## 2025-04-18 RX ORDER — SODIUM CHLORIDE, SODIUM LACTATE, POTASSIUM CHLORIDE, CALCIUM CHLORIDE 600; 310; 30; 20 MG/100ML; MG/100ML; MG/100ML; MG/100ML
125 INJECTION, SOLUTION INTRAVENOUS CONTINUOUS
Status: DISCONTINUED | OUTPATIENT
Start: 2025-04-18 | End: 2025-04-18

## 2025-04-18 RX ORDER — METHYLERGONOVINE MALEATE 0.2 MG/ML
0.2 INJECTION INTRAVENOUS ONCE AS NEEDED
Status: DISCONTINUED | OUTPATIENT
Start: 2025-04-18 | End: 2025-04-18

## 2025-04-18 RX ORDER — OXYTOCIN 10 [USP'U]/ML
10 INJECTION, SOLUTION INTRAMUSCULAR; INTRAVENOUS ONCE AS NEEDED
Status: DISCONTINUED | OUTPATIENT
Start: 2025-04-18 | End: 2025-04-18

## 2025-04-18 RX ORDER — LIDOCAINE HYDROCHLORIDE 10 MG/ML
0-30 INJECTION, SOLUTION EPIDURAL; INFILTRATION; INTRACAUDAL; PERINEURAL ONCE AS NEEDED
Status: CANCELLED | OUTPATIENT
Start: 2025-04-18

## 2025-04-18 RX ORDER — CALCIUM CARBONATE 200(500)MG
200 TABLET,CHEWABLE ORAL EVERY 4 HOURS PRN
Status: DISCONTINUED | OUTPATIENT
Start: 2025-04-18 | End: 2025-04-19 | Stop reason: HOSPADM

## 2025-04-18 RX ORDER — ACETAMINOPHEN 325 MG/1
650 TABLET ORAL EVERY 4 HOURS PRN
Status: DISCONTINUED | OUTPATIENT
Start: 2025-04-18 | End: 2025-04-19 | Stop reason: HOSPADM

## 2025-04-18 RX ORDER — OXYTOCIN/0.9 % SODIUM CHLORIDE 30/500 ML
PLASTIC BAG, INJECTION (ML) INTRAVENOUS
Status: DISPENSED
Start: 2025-04-18 | End: 2025-04-19

## 2025-04-18 RX ORDER — DIPHENHYDRAMINE HCL 25 MG
25 CAPSULE ORAL EVERY 6 HOURS PRN
Status: DISCONTINUED | OUTPATIENT
Start: 2025-04-18 | End: 2025-04-19 | Stop reason: HOSPADM

## 2025-04-18 RX ORDER — OXYTOCIN/0.9 % SODIUM CHLORIDE 30/500 ML
667 PLASTIC BAG, INJECTION (ML) INTRAVENOUS ONCE
Status: CANCELLED | OUTPATIENT
Start: 2025-04-18 | End: 2025-04-18

## 2025-04-18 RX ORDER — TRANEXAMIC ACID 10 MG/ML
1000 INJECTION, SOLUTION INTRAVENOUS ONCE AS NEEDED
Status: DISCONTINUED | OUTPATIENT
Start: 2025-04-18 | End: 2025-04-18

## 2025-04-18 RX ORDER — OXYTOCIN/0.9 % SODIUM CHLORIDE 30/500 ML
PLASTIC BAG, INJECTION (ML) INTRAVENOUS
Status: COMPLETED
Start: 2025-04-18 | End: 2025-04-18

## 2025-04-18 RX ORDER — OXYTOCIN/0.9 % SODIUM CHLORIDE 30/500 ML
83 PLASTIC BAG, INJECTION (ML) INTRAVENOUS CONTINUOUS
Status: CANCELLED | OUTPATIENT
Start: 2025-04-18 | End: 2025-04-18

## 2025-04-18 RX ORDER — DIBUCAINE 1 %
1 OINTMENT (GRAM) TOPICAL AS NEEDED
Status: DISCONTINUED | OUTPATIENT
Start: 2025-04-18 | End: 2025-04-19 | Stop reason: HOSPADM

## 2025-04-18 RX ORDER — AMOXICILLIN 250 MG
1 CAPSULE ORAL 2 TIMES DAILY
Status: DISCONTINUED | OUTPATIENT
Start: 2025-04-18 | End: 2025-04-19 | Stop reason: HOSPADM

## 2025-04-18 RX ORDER — IBUPROFEN 600 MG/1
600 TABLET ORAL EVERY 6 HOURS
Status: DISCONTINUED | OUTPATIENT
Start: 2025-04-18 | End: 2025-04-19 | Stop reason: HOSPADM

## 2025-04-18 RX ORDER — ONDANSETRON HYDROCHLORIDE 2 MG/ML
4 INJECTION, SOLUTION INTRAVENOUS EVERY 6 HOURS PRN
Status: DISCONTINUED | OUTPATIENT
Start: 2025-04-18 | End: 2025-04-19 | Stop reason: HOSPADM

## 2025-04-18 RX ORDER — ALUMINUM HYDROXIDE, MAGNESIUM HYDROXIDE, AND SIMETHICONE 1200; 120; 1200 MG/30ML; MG/30ML; MG/30ML
30 SUSPENSION ORAL EVERY 4 HOURS PRN
Status: DISCONTINUED | OUTPATIENT
Start: 2025-04-18 | End: 2025-04-19 | Stop reason: HOSPADM

## 2025-04-18 RX ORDER — MISOPROSTOL 200 UG/1
1000 TABLET ORAL ONCE AS NEEDED
Status: DISCONTINUED | OUTPATIENT
Start: 2025-04-18 | End: 2025-04-18

## 2025-04-18 RX ORDER — CARBOPROST TROMETHAMINE 250 UG/ML
250 INJECTION, SOLUTION INTRAMUSCULAR ONCE AS NEEDED
Status: DISCONTINUED | OUTPATIENT
Start: 2025-04-18 | End: 2025-04-18

## 2025-04-18 RX ORDER — ONDANSETRON 4 MG/1
4 TABLET, ORALLY DISINTEGRATING ORAL EVERY 6 HOURS PRN
Status: DISCONTINUED | OUTPATIENT
Start: 2025-04-18 | End: 2025-04-19 | Stop reason: HOSPADM

## 2025-04-18 RX ADMIN — IBUPROFEN 600 MG: 600 TABLET, FILM COATED ORAL at 11:28

## 2025-04-18 RX ADMIN — SENNOSIDES AND DOCUSATE SODIUM 1 TABLET: 50; 8.6 TABLET ORAL at 13:48

## 2025-04-18 RX ADMIN — SENNOSIDES AND DOCUSATE SODIUM 1 TABLET: 50; 8.6 TABLET ORAL at 20:21

## 2025-04-18 RX ADMIN — SODIUM CHLORIDE, POTASSIUM CHLORIDE, SODIUM LACTATE AND CALCIUM CHLORIDE 1000 ML: 600; 310; 30; 20 INJECTION, SOLUTION INTRAVENOUS at 09:56

## 2025-04-18 RX ADMIN — DIBUCAINE 1% 1 APPLICATION: 1 CREAM TOPICAL at 13:48

## 2025-04-18 RX ADMIN — BENZOCAINE AND LEVOMENTHOL: 200; 5 SPRAY TOPICAL at 13:48

## 2025-04-18 RX ADMIN — Medication 30 UNITS: at 10:30

## 2025-04-18 RX ADMIN — IBUPROFEN 600 MG: 600 TABLET, FILM COATED ORAL at 20:21

## 2025-04-18 RX ADMIN — PRENATAL VIT W/ FE FUMARATE-FA TAB 27-0.8 MG 1 TABLET: 27-0.8 TAB at 13:47

## 2025-04-18 NOTE — H&P
HPI     Shreya De Jesus is a 34 y.o. female  at 40w1d with an estimated due date of 2025, by Last Menstrual Period who presents with painful regular contractions. Denies any LOF, VB. Reports good FM    Pregnancy has been uncomplicated except for suboptimal cardiac views on US.     Last PO intake:   ,     ,      OB History:   OB History    Para Term  AB Living   5 4 4 0 0 4   SAB IAB Ectopic Multiple Live Births   0 0 0 0 4      # Outcome Date GA Lbr [place]/2nd Weight Sex Type Anes PTL Lv   5 Current            4 Term 22 40w6d 01:59 / 00:04 4.095 kg (9 lb 0.5 oz) F Vag-Spont EPI N CHYNA      Complications: Precipitous Labor      Name: FAMILIAGIRL      Apgar1: 8  Apgar5: 8   3 Term 10/22/20 39w4d 06:55 / 00:48 4.273 kg (9 lb 6.7 oz) M Vag-Spont EPI N CHYNA      Complications: Other (comment)      Name: FAMILIABOY      Apgar1: 8  Apgar5: 9   2 Term 12/10/16    M  None N CHYNA   1 Term 14    F Vag-Spont EPI N CHYNA       Medical History:   Past Medical History:   Diagnosis Date    Precipitous delivery        Surgical History:   Past Surgical History   Procedure Laterality Date    Cyst removal      on heart    [place] tooth extraction         Social History:   Social History     Socioeconomic History    Marital status: Single   Tobacco Use    Smoking status: Never    Smokeless tobacco: Never   Substance and Sexual Activity    Alcohol use: Not Currently    Drug use: Never    Sexual activity: Yes     Social Drivers of Health     Financial Resource Strain: Low Risk  (10/21/2020)    Overall Financial Resource Strain (CARDIA)     Difficulty of Paying Living Expenses: Not hard at all   Food Insecurity: No Food Insecurity (10/21/2020)    Hunger Vital Sign     Worried About Running Out of Food in the Last Year: Never true     Ran Out of Food in the Last Year: Never true   Transportation Needs: No Transportation Needs (10/21/2020)    PRAPARE - Transportation     Lack of Transportation (Medical): No      Lack of Transportation (Non-Medical): No        Family History:   Family History   Problem Relation Name Age of Onset    Diabetes Maternal Grandmother         Allergies: Patient has no known allergies.    Prior to Admission medications    Medication Sig Start Date End Date Taking? Authorizing Provider   ibuprofen (MOTRIN) 600 mg tablet Take 1 tablet (600 mg total) by mouth every 6 (six) hours as needed for pain for up to 10 days. 11/7/22 11/17/22  Melissa Flowers MD   prenatal 21-iron fu-folic acid 14 mg iron- 400 mcg tablet Take 1 tablet by mouth daily. 5/8/13   ProviderGinna MD   sennosides-docusate sodium (SENOKOT-S) 8.6-50 mg Take 1 tablet by mouth 2 (two) times a day for 179 doses. 10/23/20 1/21/21  Zabrina Chacon MD       Review of Systems  Pertinent items are noted in HPI.    Objective     Vital Signs for the last 24 hours:       Latest cervical exam:  Cervical Dilation (cm): 9        Method: sterile exam per provider (04/18/25 0946)      Fetal Monitoring:  FHR Baseline: 140  FHR Variability: moderate  FHR Accelerations: present   FHR Decelerations: absent    Contraction Frequency: q2 min    Exam:  General Appearance: Alert, cooperative, no acute distress  Lungs: Clear to auscultation bilaterally, respirations unlabored  Heart: Regular rate and rhythm, S1 and S2 normal, no murmur, rub or gallop  Abdomen: gravid, nontender  Genitalia: See vaginal exam  Extremities: no edema or calf tenderness  Neurologic: grossly intact without focal deficits    Ultrasounds:   I have reviewed the applicable Ultrasounds.      Labs:  ABO   Date Value Ref Range Status   11/06/2022 O  Final     Rh Factor   Date Value Ref Range Status   11/06/2022 Positive  Final     Rubella IgG Scr   Date Value Ref Range Status   04/21/2020 immune  Final     Strep Gp B Cult/DNA Probe   Date Value Ref Range Status   10/05/2022 No Group B Streptococcus Isolated See table below, No growth at 18-24 hours, Probable contaminants, suggest  recollection, No growth at 48 hours, No growth at 72 hours, No growth at 96 hours, No growth at 120 hours, No growth at 1 week, No growth at 2 weeks, No growth at 3 weeks, No gr... Final       Assessment/Plan     Shreya De Jesus is a 34 y.o. female  at 40w1d admitted for labor management   - admit to L&D  - admit labs/IVF  - will attempt to get epidural  - expectant management     FHR: Reactive  GBS: negative  Estimated discharge date: 2025    Melissa Flowers MD

## 2025-04-18 NOTE — L&D DELIVERY NOTE
OB Vaginal Delivery Note    Delivery:t 10:24 AM  Patient:Shreya De Jesus  :1990    Review the Delivery Report for details.     Delivery Details    Pre-Op Diagnosis: 1. 34 y.o.  intrauterine pregnancy at 40w1d with chen gestation.  2. Active labor   Post-Op Diagnosis: 1. Same   2. Viable female infant   3. Shoulder Dystocia   Delivery Clinician: Melissa Flowers   Delivery Assist;Delivery Nurse;Nursery Nurse ;Azalia Dale;Sharron Ball   Delivery Type: Vaginal, Spontaneous   Labor Complications: None   EBL: 200 mL   Anesthesia Type: None   Placenta Delivery Spontaneous   Placenta Disposition: discarded   Additional Specimens Cord Blood      INFANT INFORMATION  Time of Birth:10:24 AM  Presentation: Vertex  Position:Left,Occiput,Anterior  Cord: 3 vessels,Complications:None  Bristow Sex: female  B[place] Weight: 3.635 kg (8 lb 0.2 oz)     1 Minute 5 Minute 10 Minute   Apgar Totals: 8   9          Information for the patient's :  Liza Girl [[id number]]     [place] Cord Gas       None            Delivery Details:    Shreya De Jesus is a 34 y.o.  at 40w1d gestation who presented to the hospital for 40 weeks gestation of pregnancy [Z3A.40].  Her labor was augmented AROM.  The patient progressed to fully dilated and pushed for  hours and   minutes.  A viable  female infant was delivered by Vaginal, Spontaneous from Left,Occiput,Anterior.  Delivery of the anterior shoulder was met with difficulty and therefore easton was performed followed by rotarional maneuvers and then the shoulders delivered without difficulty followed by the remainder of the infant. A spontaneous cry was heard, and the infant appeared to be moving all 4 extremities. The cord was clamped and cut with 3 vessels noted.  The placenta delivered spontaneously shortly thereafter.  Fundal massage was performed and the fundus was found to be firm, and lochia was within normal limits. The perineum,  vagina, cervix were inspected, and the following lacerations were noted:      Episiotomy: None   Lacerations:   Perineal: None Repaired:      Periurethral:    Repaired:     Labial:   Repaired:     Sulcus:   Repaired:     Vaginal:   Repaired:     Cervical:    Repaired:        Any lacerations were repaired in the usual fashion using   suture. Excellent hemostasis was noted. At the completion of the case, sponge and needle counts were correct. The infant and patient were left in the delivery room in stable condition.     Attending Attestation: I was present and scrubbed for the entire procedure.    Melissa Flowers MD

## 2025-04-19 VITALS
HEIGHT: 67 IN | BODY MASS INDEX: 25.27 KG/M2 | HEART RATE: 70 BPM | SYSTOLIC BLOOD PRESSURE: 109 MMHG | WEIGHT: 161 LBS | TEMPERATURE: 98.1 F | RESPIRATION RATE: 18 BRPM | DIASTOLIC BLOOD PRESSURE: 65 MMHG | OXYGEN SATURATION: 97 %

## 2025-04-19 PROCEDURE — 63700000 HC SELF-ADMINISTRABLE DRUG: Performed by: STUDENT IN AN ORGANIZED HEALTH CARE EDUCATION/TRAINING PROGRAM

## 2025-04-19 RX ORDER — AMOXICILLIN 250 MG
1 CAPSULE ORAL 2 TIMES DAILY
Qty: 60 TABLET | Refills: 0 | Status: SHIPPED | OUTPATIENT
Start: 2025-04-19 | End: 2025-05-19

## 2025-04-19 RX ORDER — IBUPROFEN 600 MG/1
600 TABLET ORAL EVERY 6 HOURS
Qty: 40 TABLET | Refills: 0 | Status: SHIPPED | OUTPATIENT
Start: 2025-04-19 | End: 2025-04-29

## 2025-04-19 RX ADMIN — IBUPROFEN 600 MG: 600 TABLET, FILM COATED ORAL at 03:13

## 2025-04-19 NOTE — NURSING NOTE
Discharge education and teaching completed. Patient verbalized understanding of follow up appointments, medications, and reasons to contact physician. Patient discharged in stable condition to home with family.

## 2025-04-19 NOTE — PROGRESS NOTES
"Postpartum Progress Note    S: No events overnight. Pt reports normal lochia, voiding without issue, pain well-controlled; denies fevers or chills.    O: Visit Vitals  /65 (BP Location: Left upper arm, Patient Position: Lying)   Pulse 70   Temp 36.7 °C (98.1 °F) (Oral)   Resp 18   Ht 1.702 m (5' 7\")   Wt 73 kg (161 lb)   LMP 2024   SpO2 97%   Breastfeeding No   BMI 25.22 kg/m²     Normal Respiratory effort  Abdomen soft, nontender  Uterus firm, nontender, below umbilicus  Ext: calves nontender, without erythema    A/P: 34 y.o. F PPD#1 s/p   - pt meeting all milestones without complaint  - plan for d/c to home today per patient request    Nazia Zhang MD  9:37 AM, 2025  [facility]  Office: [phone]    "

## 2025-04-19 NOTE — DISCHARGE INSTRUCTIONS
POSTPARTUM DISCHARGE INSTRUCTIONS VAGINAL DELIVERY      Activities/Restrictions:   -No driving until you feel you are able to brake your car in an emergency. This is usually 1-2 weeks but is patient dependent as people heal at different rates.   -No baths or swimming for 4 weeks.  Showers only.   -No tampons, douching, intercourse for 6 weeks   -No exercise for 4 weeks.    Medications:   -Please resume prenatal vitamins if you are breast feeding.  -Take colace 50 mg tablets, 1-2 tablets daily as a stool softener. Additionally you may take Miralax 17g of powder daily as needed for constipation. This is an over-the-counter medication in a purple and white bottle.  -Tylenol 650 mg every 6 hours; ibuprofen 600 mg every 6 hours  -Iron 325mg one tablet by mouth daily if prescribed.   -For hemorrhoids, use Tucks pads, Preparation H. If this is not working for you, call the office for a prescription medication.  -For cracked/sore nipples you may use Lanolin cream.    Please resume your general diet.     To help with a episiotomy or vaginal laceration disomfort, you may:  1. Apply cold packs or chilled witch-marie pads to the area  2. Take sitz baths (soaking in a few inches of warm water will help)  3. Use over the counter Dermaplast spray or nupercainal ointment which will numb the area  4. Apply warm water to the area after urination using a squeeze water bottle  5. Always wipe from front to back to prevent infection    Please call the office if you have:  1. Heavy vaginal bleeding (soaking more than one pad per hour or passing clots larger than a fist)  2. Increasing redness or swelling at or near your vaginal laceration or episiotomy site  3. Inability to tolerate food or drink without vomiting  4. Opening, bleeding, discharge or separation of your laceration or episiotomy site  5. Foul smelling discharge  6. Chills or fever over 100.4 degrees Fahrenheit  7. Increasing pain (not relieved by pain medication)  8.  "Headache unrelieved by \"pain meds\"  9. New calf pain especially if only on one side  10. Unrelieved feelings of:  Inability to cope  Sadness  Anxiety  Lack of interest in baby  Insomnia  Crying    Additionally [facility] recommends you visit a primary care physician regularly. Your PCP can help you implement the recommendations we gave you today, coordinate care among your specialists, as well as make sure you are up to date with wellness exams, immunizations and preventive screenings. Your PCP can also help when you are feeling sick, potentially avoiding the need for urgent care or emergency department visits. For these reasons, it is important that you follow up with your PCP at least annually. If you do not have a PCP, please call [phone] or go to [email or web address] for help with finding one.        "

## 2025-04-19 NOTE — DISCHARGE SUMMARY
Obstetrical Discharge Form          Date of Delivery: 4/18/2025at 10:24 AM    Gestational Age:40w1d    Delivered By: [facility]    Delivery Type: spontaneous vaginal delivery    Antepartum complications: none    Baby: Liveborn female, Apgars 8  /9  , 3.635 kg (8 lb 0.2 oz)    Anesthesia: None    Intrapartum complications: Shoulder Dystocia    Laceration: None    Feeding method: formula    Rh Immune globulin given: not applicable    Discharge Date: 4/19/25      Plan:    Follow-up appointment with your doctors in 6 weeks, please call for an appointment    Nazia Zhang MD

## 2025-05-19 NOTE — ANESTHESIA PROCEDURE NOTES
Epidural Block    Patient location during procedure: OB  Start time: 10/22/2020 5:46 PM  Reason for block: labor analgesia requested by patient and obstetrician  Staffing  Anesthesiologist: Marlon Garcia MD  Performed: anesthesiologist   Preanesthetic Checklist  Completed: patient identified, surgical consent, pre-op evaluation, timeout performed, IV checked, risks and benefits discussed, monitors and equipment checked and sterile field maintained during procedure  Epidural  Patient position: sitting  Prep: ChloraPrep and site prepped and draped  Patient monitoring: heart rate, cardiac monitor, continuous pulse ox and blood pressure  Approach: midline  Location: lumbar (1-5)  Needle  Needle gauge: 17 G  Needle length: 3.5 in  Catheter type: Single orifice  Catheter size: 19 G  Test dose: negative and lidocaine 1.5% with epinephrine 1-to-200,000  Additional Notes  Procedure well tolerated. Vital signs stable. No paresthesia.  Analgesia satisfactory. Patients nurse to notify anesthesiologist if hemodynamically unstable.    Medications Administered - 10/22/2020 5:46 PM   Lidocaine 1.5%-EPINEPHrine 1:200,000 PF (XYLOCAINE W/EPI) injection, 5 mL               ICU PACU